# Patient Record
Sex: MALE | Race: WHITE | Employment: FULL TIME | URBAN - METROPOLITAN AREA
[De-identification: names, ages, dates, MRNs, and addresses within clinical notes are randomized per-mention and may not be internally consistent; named-entity substitution may affect disease eponyms.]

---

## 2017-04-01 DIAGNOSIS — F41.1 GAD (GENERALIZED ANXIETY DISORDER): ICD-10-CM

## 2017-04-03 RX ORDER — FLUOXETINE 40 MG/1
40 CAPSULE ORAL DAILY
Qty: 90 CAPSULE | Refills: 0 | Status: SHIPPED | OUTPATIENT
Start: 2017-04-03 | End: 2017-06-28

## 2017-04-03 NOTE — TELEPHONE ENCOUNTER
FLUoxetine (PROZAC) 40 MG capsule     Last Written Prescription Date: 10/4/16  Last Fill Quantity: 90, # refills: 1  Last Office Visit with G primary care provider: 12/6/2016       Last PHQ-9 score on record=   PHQ-9 SCORE 7/19/2016   Total Score 14         NAYELI Harrison  April 3, 2017  8:21 AM

## 2017-04-20 ENCOUNTER — OFFICE VISIT (OUTPATIENT)
Dept: URGENT CARE | Facility: URGENT CARE | Age: 37
End: 2017-04-20
Payer: COMMERCIAL

## 2017-04-20 VITALS
SYSTOLIC BLOOD PRESSURE: 120 MMHG | OXYGEN SATURATION: 98 % | TEMPERATURE: 97.7 F | BODY MASS INDEX: 31.41 KG/M2 | RESPIRATION RATE: 16 BRPM | DIASTOLIC BLOOD PRESSURE: 70 MMHG | WEIGHT: 200.56 LBS | HEART RATE: 81 BPM

## 2017-04-20 DIAGNOSIS — J06.9 VIRAL URI: Primary | ICD-10-CM

## 2017-04-20 DIAGNOSIS — J02.8 ACUTE PHARYNGITIS DUE TO OTHER SPECIFIED ORGANISMS: ICD-10-CM

## 2017-04-20 LAB
DEPRECATED S PYO AG THROAT QL EIA: NORMAL
MICRO REPORT STATUS: NORMAL
SPECIMEN SOURCE: NORMAL

## 2017-04-20 PROCEDURE — 87081 CULTURE SCREEN ONLY: CPT | Performed by: FAMILY MEDICINE

## 2017-04-20 PROCEDURE — 99213 OFFICE O/P EST LOW 20 MIN: CPT | Performed by: PHYSICIAN ASSISTANT

## 2017-04-20 PROCEDURE — 87880 STREP A ASSAY W/OPTIC: CPT | Performed by: FAMILY MEDICINE

## 2017-04-20 NOTE — PATIENT INSTRUCTIONS

## 2017-04-20 NOTE — MR AVS SNAPSHOT
After Visit Summary   4/20/2017    Kolby Funk    MRN: [id number]           Patient Information     Date Of Birth          1980        Visit Information        Provider Department      4/20/2017 11:40 AM Nasim Knight PA-C [facility]        Today's Diagnoses     Viral URI    -  1    Acute pharyngitis due to other specified organisms          Care Instructions      Viral Upper Respiratory Illness (Adult)  You have a viral upper respiratory illness (URI), which is another term for the common cold. This illness is contagious during the first few days. It is spread through the air by coughing and sneezing. It may also be spread by direct contact (touching the sick person and then touching your own eyes, nose, or mouth). Frequent handwashing will decrease risk of spread. Most viral illnesses go away within 7 to 10 days with rest and simple home remedies. Sometimes the illness may last for several weeks. Antibiotics will not kill a virus, and they are generally not prescribed for this condition.    Home care    If symptoms are severe, rest at home for the first 2 to 3 days. When you resume activity, don't let yourself get too tired.    Avoid being exposed to cigarette smoke (yours or others ).    You may use acetaminophen or ibuprofen to control pain and fever, unless another medicine was prescribed. (Note: If you have chronic liver or kidney disease, have ever had a stomach ulcer or gastrointestinal bleeding, or are taking blood-thinning medicines, talk with your healthcare provider before using these medicines.) Aspirin should never be given to anyone under 18 years of age who is ill with a viral infection or fever. It may cause severe liver or brain damage.    Your appetite may be poor, so a light diet is fine. Avoid dehydration by drinking 6 to 8 glasses of fluids per day (water, soft drinks, juices, tea, or soup). Extra fluids will help loosen secretions in  the nose and lungs.    Over-the-counter cold medicines will not shorten the length of time you re sick, but they may be helpful for the following symptoms: cough, sore throat, and nasal and sinus congestion. (Note: Do not use decongestants if you have high blood pressure.)  Follow-up care  Follow up with your healthcare provider, or as advised.  When to seek medical advice  Call your healthcare provider right away if any of these occur:    Cough with lots of colored sputum (mucus)    Severe headache; face, neck, or ear pain    Difficulty swallowing due to throat pain    Fever of 100.4 F (38 C)  Call 911, or get immediate medical care  Call emergency services right away if any of these occur:    Chest pain, shortness of breath, wheezing, or difficulty breathing    Coughing up blood    Inability to swallow due to throat pain        4871-1784 [employer]. [address] [place] [address]. All rights reserved. This information is not intended as a substitute for professional medical care. Always follow your healthcare professional's instructions.              Follow-ups after your visit        Who to contact     If you have questions or need follow up information about today's clinic visit or your schedule please contact [facility] directly at [phone].  Normal or non-critical lab and imaging results will be communicated to you by Credpor[employer], letter or phone within 4 business days after the clinic has received the results. If you do not hear from us within 7 days, please contact the clinic through [employer] or phone. If you have a critical or abnormal lab result, we will notify you by phone as soon as possible.  Submit refill requests through [employer] or call your pharmacy and they will forward the refill request to us. Please allow 3 business days for your refill to be completed.          Additional Information About Your Visit        INS[employer]EN[employer] Information     [employer] lets you send  "messages to your doctor, view your test results, renew your prescriptions, schedule appointments and more. To sign up, go to [email or web address] . Click on \"Log in\" on the left side of the screen, which will take you to the Welcome page. Then click on \"Sign up Now\" on the right side of the page.     You will be asked to enter the access code listed below, as well as some personal information. Please follow the directions to create your username and password.     Your access code is: [email or web address]  Expires: 2017  1:01 PM     Your access code will  in 90 days. If you need help or a new code, please call your [place] clinic or [phone].        Care EveryWhere ID     This is your [facility] EveryWhere ID. This could be used by other organizations to access your [place] medical records  [id number]        Your Vitals Were     Pulse Temperature Respirations Pulse Oximetry BMI (Body Mass Index)       81 97.7  F (36.5  C) (Tympanic) 16 98% 31.41 kg/m2        Blood Pressure from Last 3 Encounters:   17 120/70   16 122/88   10/04/16 110/82    Weight from Last 3 Encounters:   17 200 lb 9 oz (91 kg)   16 194 lb (88 kg)   10/04/16 187 lb (84.8 kg)              We Performed the Following     Beta strep group A culture     Strep, Rapid Screen        Primary Care Provider Office Phone # Fax #    Abraham Popeye Cardoza [phone]       [facility] [address]  [address] [facility] [address]        Thank you!     Thank you for choosing [facility]  for your care. Our goal is always to provide you with excellent care. Hearing back from our patients is one way we can continue to improve our services. Please take a few minutes to complete the written survey that you may receive in the mail after your visit with us. Thank you!             Your Updated Medication List - Protect others around you: Learn how to safely use, store and throw away your medicines at " www.disposemymeds.org.          This list is accurate as of: 4/20/17  1:01 PM.  Always use your most recent med list.                   Brand Name Dispense Instructions for use    FLUoxetine 40 MG capsule    PROzac    90 capsule    Take 1 capsule (40 mg) by mouth daily

## 2017-04-21 LAB
BACTERIA SPEC CULT: NORMAL
MICRO REPORT STATUS: NORMAL
SPECIMEN SOURCE: NORMAL

## 2017-04-21 NOTE — PROGRESS NOTES
"SUBJECTIVE:    Kolby Funk presents to clinic today for evaluation of sore throat, nasal congestion, clear rhinorrhea, possible  low grade fever(subjective)  and a mild dry, non-productive cough x 4 days duration.     Child dx with Strep today.     Patient denies any SOB, wheezing or hx of resp dz. Denies any N/V/D, rash, abdominal pain or any other acute illness sxs.          OBJECTIVE:  /70 (BP Location: Right arm, Patient Position: Chair, Cuff Size: Adult Large)  Pulse 81  Temp 97.7  F (36.5  C) (Tympanic)  Resp 16  Wt 200 lb 9 oz (91 kg)  SpO2 98%  BMI 31.41 kg/m2    General appearance: alert and no apparent distress  Skin color is pink and without rash.  HEENT:   Conjunctiva not injected.  Sclera clear.  Left TM is normal: no effusions, no erythema, and normal landmarks.  Right TM is normal: no effusions, no erythema, and normal landmarks.  Nasal mucosa is congested  Oropharyngeal exam is positive for mild, diffuse, erythema.  No plaque, exudate, lesions, or ulcers.   Neck is supple with no adenopathy  CARDIAC:NORMAL - regular rate and rhythm without murmur.  RESP: Normal - CTA without rales, rhonchi, or wheezing.    Component      Latest Ref Rng & Units 4/20/2017   Specimen Description       Throat   Rapid Strep A Screen       NEGATIVE: No Group A streptococcal antigen detected by immunoassay, await . . .   Micro Report Status       FINAL 04/20/2017       ASSESSMENT/PLAN:      (J06.9,  B97.89) Viral URI  (primary encounter diagnosis)  Plan: Rtc if sxs change, worsen or fail to resolve with home comfort care measures over the next 5-7 days.   In addition to the above, viral URI \"red flag\" signs and sxs are reviewed with pt both verbally and by way of printed educational material for home review.  Pt verbalizes understanding of and agrees to the above plan.         (J02.8) Acute pharyngitis due to other specified organisms  Plan: Strep, Rapid Screen, Beta strep group A culture  As per above     "

## 2017-06-28 DIAGNOSIS — F41.1 GAD (GENERALIZED ANXIETY DISORDER): ICD-10-CM

## 2017-06-28 RX ORDER — FLUOXETINE 40 MG/1
40 CAPSULE ORAL DAILY
Qty: 30 CAPSULE | Refills: 0 | Status: SHIPPED | OUTPATIENT
Start: 2017-06-28 | End: 2017-07-25

## 2017-06-28 NOTE — LETTER
[address], [address]  [facility] [address]  Phone: [phone]  Fax: [phone]    06/28/17    Kolby Funk  [address] [place] [address]      Dear Kolby:     We recently received a call from your pharmacy requesting a refill of your medication.    A review of your chart indicates that an appointment is required with your provider for office visit. Please call the clinic at [phone] to schedule your appointment.    We have authorized one refill of your medication to allow time for you to schedule your appointment.    Taking care of your health is important to us, and ongoing visits with your provider are vital to your care.  We look forward to seeing you in the near future.          Sincerely,      Abraham Cardoza MD/Adeline SYED RN

## 2017-06-28 NOTE — TELEPHONE ENCOUNTER
FLUoxetine (PROZAC) 40 MG capsule     Last Written Prescription Date: 4/3/17  Last Fill Quantity: 90, # refills: 0  Last Office Visit with [facility] primary care provider: 12/6/2016       Last PHQ-9 score on record=   PHQ-9 SCORE 7/19/2016   Total Score 14         NAYELI Harrison  June 28, 2017  8:25 AM

## 2017-06-28 NOTE — TELEPHONE ENCOUNTER
Medication is being filled for 1 time refill only due to:  due for office visit, PHQ 9, letter sent to schedule appt     Adeline Askew RN, BS  Clinical Nurse Triage.

## 2017-07-25 DIAGNOSIS — F41.1 GAD (GENERALIZED ANXIETY DISORDER): ICD-10-CM

## 2017-07-25 NOTE — TELEPHONE ENCOUNTER
Pending Prescriptions:                       Disp   Refills    FLUoxetine (PROZAC) 40 MG capsule [Pharma*30 cap*0            Sig: TAKE 1 CAPSULE (40 MG) BY MOUTH DAILY    FLUOXETINE HCL 40 MG CAPSULE      Last Written Prescription Date: 06/28/17  Last Fill Quantity: 30,  # refills: 0   Last Office Visit with [facility], [facility] or [facility] prescribing provider: 12/06/16

## 2017-07-27 RX ORDER — FLUOXETINE 40 MG/1
CAPSULE ORAL
Qty: 30 CAPSULE | Refills: 2 | Status: SHIPPED | OUTPATIENT
Start: 2017-07-27 | End: 2017-10-25

## 2017-07-27 NOTE — TELEPHONE ENCOUNTER
Prescription approved per [facility] Refill Protocol.  Mikki Villafana RN

## 2017-10-25 DIAGNOSIS — F41.1 GAD (GENERALIZED ANXIETY DISORDER): ICD-10-CM

## 2017-10-25 NOTE — TELEPHONE ENCOUNTER
Last PHQ-9 score on record=   PHQ-9 SCORE 7/19/2016   Total Score 14       Last Office Visit with [facility], [facility] or  [facility] prescribing provider: 12/6/2016

## 2017-10-26 NOTE — TELEPHONE ENCOUNTER
Patient needs to be seen.  > 1 year since last office visit.  RX says patient taking for Anxiety, but PHQ-9 done 7/19/2016 was 14.  Depression is not on patient's problem list.  Mikki Villafana RN

## 2017-10-30 RX ORDER — FLUOXETINE 40 MG/1
CAPSULE ORAL
Qty: 30 CAPSULE | Refills: 0 | Status: SHIPPED | OUTPATIENT
Start: 2017-10-30 | End: 2017-12-06

## 2017-10-30 NOTE — TELEPHONE ENCOUNTER
Routing refill request to provider for review/approval because:  PHQ out of range and over 1 year since OV for this issue.     LM again today for call back -  Ok for 1 X refill?    Teresa Coronado, RN

## 2018-01-09 DIAGNOSIS — F41.1 GAD (GENERALIZED ANXIETY DISORDER): ICD-10-CM

## 2018-01-09 RX ORDER — FLUOXETINE 40 MG/1
CAPSULE ORAL
Qty: 30 CAPSULE | Refills: 0 | Status: SHIPPED | OUTPATIENT
Start: 2018-01-09 | End: 2018-02-11

## 2018-01-09 NOTE — TELEPHONE ENCOUNTER
"Requested Prescriptions   Pending Prescriptions Disp Refills     FLUoxetine (PROZAC) 40 MG capsule [Pharmacy Med Name: FLUOXETINE HCL 40 MG CAPSULE] 30 capsule 0    Last Written Prescription Date:  12/8/17  Last Fill Quantity: 30,  # refills: 0   [facility] Visit with [facility] or [facility] prescribing provider:  12/6/2016   Future Office Visit:    Sig: TAKE 1 CAPSULE (40 MG) BY MOUTH DAILY DUE FOR APPT    SSRIs Protocol Failed    PHQ-9 SCORE 7/19/2016   Total Score 14     NICHOLAS-7 SCORE 7/19/2016 10/4/2016   Total Score 19 8          Failed - Recent or future visit with authorizing provider    Patient had office visit in the last year or has a visit in the next 30 days with authorizing provider.  See \"Patient Info\" tab in inbasket, or \"Choose Columns\" in Meds & Orders section of the refill encounter.              Passed - Patient is age 18 or older        From last Refill:  Sulma Bridges RN    12/8/17  2:59 PM   Note      Routing refill request to provider for review/approval because:  Patient needs to be seen because it has been more than 1 year since last office visit.  Not returning call          "

## 2018-01-09 NOTE — TELEPHONE ENCOUNTER
Routing refill request to provider for review/approval because:  Cindy given x1 and patient did not follow up, please advise  PHQ 9 > 4 & out of date  Last OV over a yr ago    Adeline Askew RN, BS  Clinical Nurse Triage.

## 2018-02-11 DIAGNOSIS — F41.1 GAD (GENERALIZED ANXIETY DISORDER): ICD-10-CM

## 2018-02-12 RX ORDER — FLUOXETINE 40 MG/1
CAPSULE ORAL
Qty: 30 CAPSULE | Refills: 0 | Status: SHIPPED | OUTPATIENT
Start: 2018-02-12 | End: 2018-05-22

## 2018-02-12 NOTE — TELEPHONE ENCOUNTER
Patient had an appointment on 4/20/2017 and 1/16/2018 but canceled both appointments.    LAST OV >1 YEAR AGO.    Mikki Villafana RN

## 2018-02-12 NOTE — TELEPHONE ENCOUNTER
"Requested Prescriptions   Pending Prescriptions Disp Refills     FLUoxetine (PROZAC) 40 MG capsule [Pharmacy Med Name: FLUOXETINE HCL 40 MG CAPSULE] 30 capsule 0    Last Written Prescription Date:  1/9/18  Last Fill Quantity: 30,  # refills: 0   Last Office Visit: 12/6/2016   Future Office Visit:      Sig: TAKE 1 CAPSULE (40 MG) BY MOUTH DAILY DUE FOR APPT    SSRIs Protocol Failed    PHQ-9 SCORE 7/19/2016   Total Score 14     NICHOLAS-7 SCORE 7/19/2016 10/4/2016   Total Score 19 8            Failed - Recent or future visit with authorizing provider    Patient had office visit in the last year or has a visit in the next 30 days with authorizing provider.  See \"Patient Info\" tab in inbasket, or \"Choose Columns\" in Meds & Orders section of the refill encounter.            Passed - Patient is age 18 or older          "

## 2018-05-22 ENCOUNTER — OFFICE VISIT (OUTPATIENT)
Dept: FAMILY MEDICINE | Facility: CLINIC | Age: 38
End: 2018-05-22
Payer: COMMERCIAL

## 2018-05-22 VITALS
BODY MASS INDEX: 30.42 KG/M2 | OXYGEN SATURATION: 97 % | HEIGHT: 68 IN | SYSTOLIC BLOOD PRESSURE: 124 MMHG | TEMPERATURE: 98.2 F | WEIGHT: 200.7 LBS | RESPIRATION RATE: 16 BRPM | DIASTOLIC BLOOD PRESSURE: 88 MMHG | HEART RATE: 77 BPM

## 2018-05-22 DIAGNOSIS — F41.1 GAD (GENERALIZED ANXIETY DISORDER): ICD-10-CM

## 2018-05-22 PROCEDURE — 99214 OFFICE O/P EST MOD 30 MIN: CPT | Performed by: FAMILY MEDICINE

## 2018-05-22 RX ORDER — FLUOXETINE 40 MG/1
40 CAPSULE ORAL DAILY
Qty: 90 CAPSULE | Refills: 1 | Status: SHIPPED | OUTPATIENT
Start: 2018-05-22 | End: 2018-11-11

## 2018-05-22 ASSESSMENT — ENCOUNTER SYMPTOMS
NERVOUS/ANXIOUS: 1
RESPIRATORY NEGATIVE: 1
DEPRESSION: 0
CONSTITUTIONAL NEGATIVE: 1
CARDIOVASCULAR NEGATIVE: 1
INSOMNIA: 1

## 2018-05-22 ASSESSMENT — ANXIETY QUESTIONNAIRES
2. NOT BEING ABLE TO STOP OR CONTROL WORRYING: NEARLY EVERY DAY
GAD7 TOTAL SCORE: 15
3. WORRYING TOO MUCH ABOUT DIFFERENT THINGS: NEARLY EVERY DAY
5. BEING SO RESTLESS THAT IT IS HARD TO SIT STILL: SEVERAL DAYS
1. FEELING NERVOUS, ANXIOUS, OR ON EDGE: MORE THAN HALF THE DAYS
IF YOU CHECKED OFF ANY PROBLEMS ON THIS QUESTIONNAIRE, HOW DIFFICULT HAVE THESE PROBLEMS MADE IT FOR YOU TO DO YOUR WORK, TAKE CARE OF THINGS AT HOME, OR GET ALONG WITH OTHER PEOPLE: VERY DIFFICULT
6. BECOMING EASILY ANNOYED OR IRRITABLE: NEARLY EVERY DAY
7. FEELING AFRAID AS IF SOMETHING AWFUL MIGHT HAPPEN: SEVERAL DAYS

## 2018-05-22 ASSESSMENT — PATIENT HEALTH QUESTIONNAIRE - PHQ9: 5. POOR APPETITE OR OVEREATING: MORE THAN HALF THE DAYS

## 2018-05-22 ASSESSMENT — LIFESTYLE VARIABLES: SUBSTANCE_ABUSE: 0

## 2018-05-22 NOTE — MR AVS SNAPSHOT
"              After Visit Summary   2018    Kolby Funk    MRN: [id number]           Patient Information     Date Of Birth          1980        Visit Information        Provider Department      2018 5:20 PM Abraham Cardoza MD [facility]        Today's Diagnoses     NICHOLAS (generalized anxiety disorder)           Follow-ups after your visit        Follow-up notes from your care team     Return in about 1 month (around 2018) for Physical Exam, depression/anxiety.      Who to contact     If you have questions or need follow up information about today's clinic visit or your schedule please contact [facility] directly at [phone].  Normal or non-critical lab and imaging results will be communicated to you by MyChart, letter or phone within 4 business days after the clinic has received the results. If you do not hear from us within 7 days, please contact the clinic through MyChart or phone. If you have a critical or abnormal lab result, we will notify you by phone as soon as possible.  Submit refill requests through [employer] or call your pharmacy and they will forward the refill request to us. Please allow 3 business days for your refill to be completed.          Additional Information About Your Visit        MyChart Information     [employer] lets you send messages to your doctor, view your test results, renew your prescriptions, schedule appointments and more. To sign up, go to [email or web address] . Click on \"Log in\" on the left side of the screen, which will take you to the Welcome page. Then click on \"Sign up Now\" on the right side of the page.     You will be asked to enter the access code listed below, as well as some personal information. Please follow the directions to create your username and password.     Your access code is: [email or web address]  Expires: 2018  5:37 PM     Your access code will  in 90 days. If you need help or a new code, " "please call your [place] clinic or [phone].        Care EveryWhere ID     This is your Care EveryWhere ID. This could be used by other organizations to access your [place] medical records  [id number]        Your Vitals Were     Pulse Temperature Respirations Height Pulse Oximetry BMI (Body Mass Index)    77 98.2  F (36.8  C) (Oral) 16 5' 8.25\" (1.734 m) 97% 30.29 kg/m2       Blood Pressure from Last 3 Encounters:   05/22/18 124/88   04/20/17 120/70   12/06/16 122/88    Weight from Last 3 Encounters:   05/22/18 200 lb 11.2 oz (91 kg)   04/20/17 200 lb 9 oz (91 kg)   12/06/16 194 lb (88 kg)              Today, you had the following     No orders found for display         Today's Medication Changes          These changes are accurate as of 5/22/18  5:37 PM.  If you have any questions, ask your nurse or doctor.               These medicines have changed or have updated prescriptions.        Dose/Directions    FLUoxetine 40 MG capsule   Commonly known as:  PROzac   This may have changed:  See the new instructions.   Used for:  NICHOLSA (generalized anxiety disorder)   Changed by:  Abraham Cardoza MD        Dose:  40 mg   Take 1 capsule (40 mg) by mouth daily   Quantity:  90 capsule   Refills:  1            Where to get your medicines      These medications were sent to [facility]/pharmacy #[id number] - [place], [place] - [address]  [address] [facility], [facility] [address]     Phone:  [phone]     FLUoxetine 40 MG capsule                Primary Care Provider Office Phone # Fax #    Abraham Cardoza [phone]       [address] [facility] [address]        Equal Access to Services     [facility] : Hadii ruben conte Soharoon, waaxda luqadaha, qaybta kaalmada adeegyada, deepthi terrazas. So [facility] [phone].    ATENCIÓN: Si habla español, tiene a dawn disposición servicios gratuitos de asistencia lingüística. Llame al [phone].    We comply with " applicable federal civil rights laws and [place] laws. We do not discriminate on the basis of race, color, national origin, age, disability, sex, sexual orientation, or gender identity.            Thank you!     Thank you for choosing [facility]  for your care. Our goal is always to provide you with excellent care. Hearing back from our patients is one way we can continue to improve our services. Please take a few minutes to complete the written survey that you may receive in the mail after your visit with us. Thank you!             Your Updated Medication List - Protect others around you: Learn how to safely use, store and throw away your medicines at www.disposemymeds.org.          This list is accurate as of 5/22/18  5:37 PM.  Always use your most recent med list.                   Brand Name Dispense Instructions for use Diagnosis    FLUoxetine 40 MG capsule    PROzac    90 capsule    Take 1 capsule (40 mg) by mouth daily    NICHOLAS (generalized anxiety disorder)

## 2018-05-22 NOTE — PROGRESS NOTES
HPI    SUBJECTIVE:   Kolby Funk is a 37 year old male who presents to clinic today for the following health issues:    Anxiety Follow-Up    Status since last visit: Worsened ran out of medication    Other associated symptoms: difficulty sleeping    Complicating factors:   Significant life event: No   Current substance abuse: None  Depression symptoms: No  NICHOLAS-7 SCORE 7/19/2016 10/4/2016   Total Score 19 8       NICHOLAS-7    Amount of exercise or physical activity: 2-3 days/week for an average of 30-45 minutes    Problems taking medications regularly: Yes,  Ran out of medication    Medication side effects: none    Diet: regular (no restrictions)      Ran out of medication about 3-4 months ago. Was OK for a couple of weeks then started having more issues again.  Now is having lots of trouble sleeping.  Both falling asleep and staying asleep is difficult.  Has been using medicaiton for a long time, no issues side effects.      Review of Systems   Constitutional: Negative.    Respiratory: Negative.    Cardiovascular: Negative.    Psychiatric/Behavioral: Negative for depression, substance abuse and suicidal ideas. The patient is nervous/anxious and has insomnia.          Physical Exam   Constitutional: He is oriented to person, place, and time.   Neurological: He is alert and oriented to person, place, and time.   Skin: Skin is warm and dry.   Psychiatric: Mood and affect normal.   Vitals reviewed.    (F41.1) NICHOLAS (generalized anxiety disorder)  Comment: restarting  Plan: FLUoxetine (PROZAC) 40 MG capsule              RTC in 1m for CPE and f/u    Abraham Cardoza MD

## 2018-05-23 ASSESSMENT — ANXIETY QUESTIONNAIRES: GAD7 TOTAL SCORE: 15

## 2018-10-23 ENCOUNTER — OFFICE VISIT (OUTPATIENT)
Dept: NURSING | Facility: CLINIC | Age: 38
End: 2018-10-23
Payer: COMMERCIAL

## 2018-10-23 DIAGNOSIS — Z23 NEED FOR PROPHYLACTIC VACCINATION AND INOCULATION AGAINST INFLUENZA: Primary | ICD-10-CM

## 2018-10-23 PROCEDURE — 90471 IMMUNIZATION ADMIN: CPT

## 2018-10-23 PROCEDURE — 90686 IIV4 VACC NO PRSV 0.5 ML IM: CPT

## 2018-10-23 NOTE — MR AVS SNAPSHOT
"              After Visit Summary   10/23/2018    Kolby Funk    MRN: [id number]           Patient Information     Date Of Birth          1980        Visit Information        Provider Department      10/23/2018 6:00 PM ASHOK TAYLOR/LPN [facility]        Today's Diagnoses     Need for prophylactic vaccination and inoculation against influenza    -  1       Follow-ups after your visit        Who to contact     If you have questions or need follow up information about today's clinic visit or your schedule please contact [facility] directly at [phone].  Normal or non-critical lab and imaging results will be communicated to you by [email or web address], letter or phone within 4 business days after the clinic has received the results. If you do not hear from us within 7 days, please contact the clinic through FeedMagnett or phone. If you have a critical or abnormal lab result, we will notify you by phone as soon as possible.  Submit refill requests through OfficialVirtualDJ or call your pharmacy and they will forward the refill request to us. Please allow 3 business days for your refill to be completed.          Additional Information About Your Visit        MyChart Information     OfficialVirtualDJ lets you send messages to your doctor, view your test results, renew your prescriptions, schedule appointments and more. To sign up, go to [email or web address] . Click on \"Log in\" on the left side of the screen, which will take you to the Welcome page. Then click on \"Sign up Now\" on the right side of the page.     You will be asked to enter the access code listed below, as well as some personal information. Please follow the directions to create your username and password.     Your access code is: [email or web address]  Expires: 2019  6:40 PM     Your access code will  in 90 days. If you need help or a new code, please call your [facility] or [phone].        Care EveryWhere ID     This is your Care " EveryWhere ID. This could be used by other organizations to access your [place] medical records  [id number]         Blood Pressure from Last 3 Encounters:   05/22/18 124/88   04/20/17 120/70   12/06/16 122/88    Weight from Last 3 Encounters:   05/22/18 200 lb 11.2 oz (91 kg)   04/20/17 200 lb 9 oz (91 kg)   12/06/16 194 lb (88 kg)              We Performed the Following     FLU VACCINE, SPLIT VIRUS, IM (QUADRIVALENT) [24916]- >3 YRS     Vaccine Administration, Initial [47869]        Primary Care Provider Office Phone # Fax #    Abraham Popeye Cardoza [phone]       [id number]  [facility] [address]        Equal Access to Services     MURPHY [place] : Hadii ruben cisse hadasho Soomaali, waaxda luqadaha, qaybta kaalmada adeegyada, deepthi terrazas. So [facility] [phone].    ATENCIÓN: Si habla español, tiene a dawn disposición servicios gratuitos de asistencia lingüística. Llame al [phone].    We comply with applicable federal civil rights laws and [place] laws. We do not discriminate on the basis of race, color, national origin, age, disability, sex, sexual orientation, or gender identity.            Thank you!     Thank you for choosing [facility]  for your care. Our goal is always to provide you with excellent care. Hearing back from our patients is one way we can continue to improve our services. Please take a few minutes to complete the written survey that you may receive in the mail after your visit with us. Thank you!             Your Updated Medication List - Protect others around you: Learn how to safely use, store and throw away your medicines at www.disposemymeds.org.          This list is accurate as of 10/23/18  6:40 PM.  Always use your most recent med list.                   Brand Name Dispense Instructions for use Diagnosis    FLUoxetine 40 MG capsule    PROzac    90 capsule    Take 1 capsule (40 mg) by mouth daily    NICHOLAS (generalized  anxiety disorder)

## 2018-10-23 NOTE — PROGRESS NOTES
Injectable Influenza Immunization Documentation    1.  Is the person to be vaccinated sick today?   No    2. Does the person to be vaccinated have an allergy to a component   of the vaccine?   No  Egg Allergy Algorithm Link    3. Has the person to be vaccinated ever had a serious reaction   to influenza vaccine in the past?   No    4. Has the person to be vaccinated ever had Guillain-Barré syndrome?   No    Form completed by Fiorella White CMA ([facility])

## 2019-02-22 DIAGNOSIS — F41.1 GAD (GENERALIZED ANXIETY DISORDER): ICD-10-CM

## 2019-02-22 NOTE — TELEPHONE ENCOUNTER
"Requested Prescriptions   Pending Prescriptions Disp Refills     FLUoxetine (PROZAC) 40 MG capsule [Pharmacy Med Name: FLUOXETINE HCL 40 MG CAPSULE]  PATIENT NEEDS AN OFFICE VISIT FOR FURTHER REFILLS.  Last Written Prescription Date:  2/4/19  Last Fill Quantity: 20 CAPSULE,  # refills: 0   Last office visit: 5/22/2018 with prescribing provider:  LOUISE   Future Office Visit:     20 capsule 0     Sig: TAKE 1 CAPSULE (40 MG) BY MOUTH DAILY **DUE FOR APPT**    SSRIs Protocol Passed - 2/22/2019  3:41 PM  PHQ-9 SCORE 7/19/2016   PHQ-9 Total Score 14     NICHOLAS-7 SCORE 7/19/2016 10/4/2016 5/22/2018   Total Score 19 8 15              Passed - Recent (12 mo) or future (30 days) visit within the authorizing provider's specialty    Patient had office visit in the last 12 months or has a visit in the next 30 days with authorizing provider or within the authorizing provider's specialty.  See \"Patient Info\" tab in inbasket, or \"Choose Columns\" in Meds & Orders section of the refill encounter.             Passed - Medication is active on med list       Passed - Patient is age 18 or older          "

## 2019-02-25 RX ORDER — FLUOXETINE 40 MG/1
CAPSULE ORAL
Qty: 20 CAPSULE | Refills: 0 | Status: SHIPPED | OUTPATIENT
Start: 2019-02-25 | End: 2019-03-30

## 2019-02-25 NOTE — TELEPHONE ENCOUNTER
Prescription approved per [facility] Refill Protocol.    Saba Gallego, RN  Patient Care Supervisor  [facility] [place][note 2]  [phone]

## 2019-03-15 DIAGNOSIS — F41.1 GAD (GENERALIZED ANXIETY DISORDER): ICD-10-CM

## 2019-03-15 NOTE — TELEPHONE ENCOUNTER
"Requested Prescriptions   Pending Prescriptions Disp Refills     FLUoxetine (PROZAC) 40 MG capsule [Pharmacy Med Name: FLUOXETINE HCL 40 MG CAPSULE]    Last Written Prescription Date:  2/25/19  Last Fill Quantity: 20,  # refills: 0   Last office visit: 5/22/2018 with prescribing provider:  Nani   Future Office Visit:     20 capsule 0     Sig: TAKE 1 CAPSULE (40 MG) BY MOUTH DAILY **DUE FOR APPT**    SSRIs Protocol Passed - 3/15/2019  3:37 PM       Passed - Recent (12 mo) or future (30 days) visit within the authorizing provider's specialty    Patient had office visit in the last 12 months or has a visit in the next 30 days with authorizing provider or within the authorizing provider's specialty.  See \"Patient Info\" tab in inbasket, or \"Choose Columns\" in Meds & Orders section of the refill encounter.             Passed - Medication is active on med list       Passed - Patient is age 18 or older          "

## 2019-03-19 RX ORDER — FLUOXETINE 40 MG/1
CAPSULE ORAL
Refills: 0 | OUTPATIENT
Start: 2019-03-19

## 2019-03-19 NOTE — TELEPHONE ENCOUNTER
Routing refill request to provider for review/approval because:  Cindy given x3 and patient did not follow up, please advise  Pt was due in June and has not responded to clinic  Derek Bridges RN, BSN

## 2019-03-25 DIAGNOSIS — F41.1 GAD (GENERALIZED ANXIETY DISORDER): ICD-10-CM

## 2019-03-25 NOTE — TELEPHONE ENCOUNTER
"Requested Prescriptions   Pending Prescriptions Disp Refills     FLUoxetine (PROZAC) 40 MG capsule [Pharmacy Med Name: FLUOXETINE HCL 40 MG CAPSULE] 20 capsule 0    Last Written Prescription Date:  02/25/2019  Last Fill Quantity: 20 tablet,  # refills: 0   Last office visit: 5/22/2018 with prescribing provider:  05/22/2018   Future Office Visit:     Sig: TAKE 1 CAPSULE (40 MG) BY MOUTH DAILY DUE FOR APPT    SSRIs Protocol Passed - 3/25/2019 10:07 AM       Passed - Recent (12 mo) or future (30 days) visit within the authorizing provider's specialty    Patient had office visit in the last 12 months or has a visit in the next 30 days with authorizing provider or within the authorizing provider's specialty.  See \"Patient Info\" tab in inbasket, or \"Choose Columns\" in Meds & Orders section of the refill encounter.             Passed - Medication is active on med list       Passed - Patient is age 18 or older        aJcob TYLER  "

## 2019-03-26 RX ORDER — FLUOXETINE 40 MG/1
CAPSULE ORAL
Qty: 20 CAPSULE | Refills: 0 | OUTPATIENT
Start: 2019-03-26

## 2019-03-26 NOTE — TELEPHONE ENCOUNTER
See 3/15/2019 refill.  RX was denied by Dr. Cardoza. Patient needs an appointment.    Mikki Villafana RN

## 2019-03-30 DIAGNOSIS — F41.1 GAD (GENERALIZED ANXIETY DISORDER): ICD-10-CM

## 2019-04-01 NOTE — TELEPHONE ENCOUNTER
"Requested Prescriptions   Pending Prescriptions Disp Refills     FLUoxetine (PROZAC) 40 MG capsule [Pharmacy Med Name: FLUOXETINE HCL 40 MG CAPSULE] 20 capsule 0    Last Written Prescription Date:  2/25/19  Last Fill Quantity: 20,  # refills: 0   Last Office Visit: 5/22/2018   Future Office Visit:      Sig: TAKE 1 CAPSULE (40 MG) BY MOUTH DAILY DUE FOR APPT    SSRIs Protocol Passed - 3/30/2019  3:54 PM   PHQ-9 SCORE 7/19/2016   PHQ-9 Total Score 14     NICHOLAS-7 SCORE 7/19/2016 10/4/2016 5/22/2018   Total Score 19 8 15         Passed - Recent (12 mo) or future (30 days) visit within the authorizing provider's specialty    Patient had office visit in the last 12 months or has a visit in the next 30 days with authorizing provider or within the authorizing provider's specialty.  See \"Patient Info\" tab in inbasket, or \"Choose Columns\" in Meds & Orders section of the refill encounter.             Passed - Medication is active on med list       Passed - Patient is age 18 or older        "

## 2019-04-02 RX ORDER — FLUOXETINE 40 MG/1
CAPSULE ORAL
Qty: 20 CAPSULE | Refills: 0 | Status: SHIPPED | OUTPATIENT
Start: 2019-04-02 | End: 2019-04-30

## 2019-04-02 NOTE — TELEPHONE ENCOUNTER
Spoke with patient to confirm that he is unable to come in due to work schedule.  Only available on Tuesdays.  Offered a Saturday appointment at [place] but patient declined seeing another physician.  Please advise whether waiting until 4/23 is appropriate or if a telephone visit would work.  Fiorella White CMA ([facility])

## 2019-04-02 NOTE — TELEPHONE ENCOUNTER
Patient called and would like to know if we can send in a refill for him until his appointment on 4/23/19. Patient is traveling for work and this is the 1st Tuesday he can come in.   Please review and advise.    Jamila Reilly

## 2019-04-02 NOTE — TELEPHONE ENCOUNTER
Will route to MA/TC pool. Also routing to PCP.     Looks like multiple heriberto refills had been issued to the Pt, he was advised to schedule an OV at those times.   All we can do is again call him and offer an office visit.     Will route to Dr. Cardoza as well to review.     Amelia Tai RN -- [facility] Workforce

## 2019-04-04 NOTE — TELEPHONE ENCOUNTER
Left detailed message for patient to call to schedule an appointment.  Please remove refill and close this encounter.  Fiorella White CMA ([facility])

## 2019-04-30 ENCOUNTER — OFFICE VISIT (OUTPATIENT)
Dept: FAMILY MEDICINE | Facility: CLINIC | Age: 39
End: 2019-04-30
Payer: COMMERCIAL

## 2019-04-30 VITALS
TEMPERATURE: 98 F | RESPIRATION RATE: 16 BRPM | DIASTOLIC BLOOD PRESSURE: 68 MMHG | OXYGEN SATURATION: 97 % | SYSTOLIC BLOOD PRESSURE: 116 MMHG | WEIGHT: 207 LBS | BODY MASS INDEX: 31.37 KG/M2 | HEIGHT: 68 IN | HEART RATE: 84 BPM

## 2019-04-30 DIAGNOSIS — F41.1 GAD (GENERALIZED ANXIETY DISORDER): ICD-10-CM

## 2019-04-30 PROCEDURE — 99213 OFFICE O/P EST LOW 20 MIN: CPT | Performed by: NURSE PRACTITIONER

## 2019-04-30 RX ORDER — FLUOXETINE 40 MG/1
CAPSULE ORAL
Qty: 90 CAPSULE | Refills: 1 | Status: SHIPPED | OUTPATIENT
Start: 2019-04-30 | End: 2019-10-18

## 2019-04-30 ASSESSMENT — ANXIETY QUESTIONNAIRES
7. FEELING AFRAID AS IF SOMETHING AWFUL MIGHT HAPPEN: SEVERAL DAYS
GAD7 TOTAL SCORE: 12
6. BECOMING EASILY ANNOYED OR IRRITABLE: MORE THAN HALF THE DAYS
4. TROUBLE RELAXING: SEVERAL DAYS
7. FEELING AFRAID AS IF SOMETHING AWFUL MIGHT HAPPEN: SEVERAL DAYS
2. NOT BEING ABLE TO STOP OR CONTROL WORRYING: MORE THAN HALF THE DAYS
1. FEELING NERVOUS, ANXIOUS, OR ON EDGE: NEARLY EVERY DAY
GAD7 TOTAL SCORE: 12
3. WORRYING TOO MUCH ABOUT DIFFERENT THINGS: MORE THAN HALF THE DAYS
GAD7 TOTAL SCORE: 12
5. BEING SO RESTLESS THAT IT IS HARD TO SIT STILL: SEVERAL DAYS

## 2019-04-30 ASSESSMENT — PATIENT HEALTH QUESTIONNAIRE - PHQ9
SUM OF ALL RESPONSES TO PHQ QUESTIONS 1-9: 5
SUM OF ALL RESPONSES TO PHQ QUESTIONS 1-9: 5
10. IF YOU CHECKED OFF ANY PROBLEMS, HOW DIFFICULT HAVE THESE PROBLEMS MADE IT FOR YOU TO DO YOUR WORK, TAKE CARE OF THINGS AT HOME, OR GET ALONG WITH OTHER PEOPLE: SOMEWHAT DIFFICULT

## 2019-04-30 ASSESSMENT — MIFFLIN-ST. JEOR: SCORE: 1832.42

## 2019-04-30 NOTE — PROGRESS NOTES
Answers for HPI/ROS submitted by the patient on 4/30/2019   Chronic problems general questions HPI Form  If you checked off any problems, how difficult have these problems made it for you to do your work, take care of things at home, or get along with other people?: Somewhat difficult  PHQ9 TOTAL SCORE: 5  NICHOLAS 7 TOTAL SCORE: 12  Diet:: Regular (no restrictions)  Taking medications regularly:: Yes  Medication side effects:: None  Depression/Anxiety: Anxiety only  Status since last visit:: Stable  Other associated symptoms of anxiety:: None  Significant life event:: No  Current substance use:: Alcohol  Depression symptoms:: None    SUBJECTIVE:   Kolby Funk is a 38 year old male who presents to clinic today for the following   health issues:      Anxiety Follow-Up    Status since last visit: No change    Other associated symptoms:None    Complicating factors:   Significant life event: No   Current substance abuse: None  Depression symptoms: No  NICHOLAS-7 SCORE 10/4/2016 5/22/2018 4/30/2019   Total Score - - 12 (moderate anxiety)   Total Score 8 15 12       NICHOLAS-7    Amount of exercise or physical activity: None    Problems taking medications regularly: No    Medication side effects: none    Diet: regular (no restrictions)    Feels anxiety is well controlled.  No side effects.  Some nights has a hard time falling asleep others may wake during the night.  This is not bothersome to him.  Longstanding medication for him and he would like to have this refilled today.       Additional history: as documented    Reviewed  and updated as needed this visit by clinical staff  Tobacco  Allergies  Meds  Med Hx  Surg Hx  Fam Hx  Soc Hx        Reviewed and updated as needed this visit by Provider  Meds           No Known Allergies    ROS:  Constitutional, HEENT, cardiovascular, pulmonary, gi and gu and psych systems are negative, except as otherwise noted.    OBJECTIVE:     /68 (BP Location: Right arm, Patient Position:  "Sitting, Cuff Size: Adult Large)   Pulse 84   Temp 98  F (36.7  C) (Oral)   Resp 16   Ht 1.734 m (5' 8.25\")   Wt 93.9 kg (207 lb)   SpO2 97%   BMI 31.24 kg/m    Body mass index is 31.24 kg/m .  GENERAL: healthy, alert and no distress  EYES: Eyes grossly normal to inspection  NECK: no adenopathy, no asymmetry, masses, or scars and thyroid normal to palpation  RESP: lungs clear to auscultation - no rales, rhonchi or wheezes  CV: regular rate and rhythm, normal S1 S2, no S3 or S4, no murmur  SKIN: warm and dry   PSYCH: mentation appears normal, affect normal/bright    Diagnostic Test Results:  none     ASSESSMENT/PLAN:   1. NICHOLAS (generalized anxiety disorder)  Despite NICHOLAS=12 he feels symptoms are well controlled.  Continue prozac.  F/u in 6 mos; okay to do a phone or evisit.   - FLUoxetine (PROZAC) 40 MG capsule; TAKE 1 CAPSULE (40 MG) BY MOUTH DAILY  Dispense: 90 capsule; Refill: 1      HAYLEY Lund [facility]        "

## 2019-05-01 ASSESSMENT — PATIENT HEALTH QUESTIONNAIRE - PHQ9: SUM OF ALL RESPONSES TO PHQ QUESTIONS 1-9: 5

## 2019-05-01 ASSESSMENT — ANXIETY QUESTIONNAIRES: GAD7 TOTAL SCORE: 12

## 2019-10-18 ENCOUNTER — OFFICE VISIT (OUTPATIENT)
Dept: FAMILY MEDICINE | Facility: CLINIC | Age: 39
End: 2019-10-18
Payer: COMMERCIAL

## 2019-10-18 VITALS
BODY MASS INDEX: 32.6 KG/M2 | WEIGHT: 216 LBS | HEART RATE: 78 BPM | RESPIRATION RATE: 12 BRPM | OXYGEN SATURATION: 97 % | SYSTOLIC BLOOD PRESSURE: 120 MMHG | DIASTOLIC BLOOD PRESSURE: 72 MMHG | TEMPERATURE: 98.2 F

## 2019-10-18 DIAGNOSIS — Z23 NEED FOR PROPHYLACTIC VACCINATION AND INOCULATION AGAINST INFLUENZA: Primary | ICD-10-CM

## 2019-10-18 DIAGNOSIS — F41.1 GAD (GENERALIZED ANXIETY DISORDER): ICD-10-CM

## 2019-10-18 PROCEDURE — 99214 OFFICE O/P EST MOD 30 MIN: CPT | Mod: 25 | Performed by: FAMILY MEDICINE

## 2019-10-18 PROCEDURE — 90686 IIV4 VACC NO PRSV 0.5 ML IM: CPT | Performed by: FAMILY MEDICINE

## 2019-10-18 PROCEDURE — 96127 BRIEF EMOTIONAL/BEHAV ASSMT: CPT | Performed by: FAMILY MEDICINE

## 2019-10-18 PROCEDURE — 90471 IMMUNIZATION ADMIN: CPT | Performed by: FAMILY MEDICINE

## 2019-10-18 RX ORDER — LORAZEPAM 0.5 MG/1
0.5 TABLET ORAL EVERY 6 HOURS PRN
Qty: 20 TABLET | Refills: 1 | Status: SHIPPED | OUTPATIENT
Start: 2019-10-18 | End: 2024-03-22

## 2019-10-18 ASSESSMENT — ENCOUNTER SYMPTOMS
DIAPHORESIS: 1
NERVOUS/ANXIOUS: 1
DECREASED CONCENTRATION: 0
DYSPHORIC MOOD: 0
SLEEP DISTURBANCE: 0
PALPITATIONS: 1

## 2019-10-18 ASSESSMENT — PATIENT HEALTH QUESTIONNAIRE - PHQ9
SUM OF ALL RESPONSES TO PHQ QUESTIONS 1-9: 3
10. IF YOU CHECKED OFF ANY PROBLEMS, HOW DIFFICULT HAVE THESE PROBLEMS MADE IT FOR YOU TO DO YOUR WORK, TAKE CARE OF THINGS AT HOME, OR GET ALONG WITH OTHER PEOPLE: SOMEWHAT DIFFICULT
SUM OF ALL RESPONSES TO PHQ QUESTIONS 1-9: 3

## 2019-10-18 ASSESSMENT — ANXIETY QUESTIONNAIRES
6. BECOMING EASILY ANNOYED OR IRRITABLE: MORE THAN HALF THE DAYS
1. FEELING NERVOUS, ANXIOUS, OR ON EDGE: SEVERAL DAYS
3. WORRYING TOO MUCH ABOUT DIFFERENT THINGS: SEVERAL DAYS
GAD7 TOTAL SCORE: 8
7. FEELING AFRAID AS IF SOMETHING AWFUL MIGHT HAPPEN: SEVERAL DAYS
4. TROUBLE RELAXING: SEVERAL DAYS
GAD7 TOTAL SCORE: 8
2. NOT BEING ABLE TO STOP OR CONTROL WORRYING: SEVERAL DAYS
GAD7 TOTAL SCORE: 8
5. BEING SO RESTLESS THAT IT IS HARD TO SIT STILL: SEVERAL DAYS
7. FEELING AFRAID AS IF SOMETHING AWFUL MIGHT HAPPEN: SEVERAL DAYS

## 2019-10-18 NOTE — PROGRESS NOTES
Subjective     Kolby Funk is a 38 year old male who presents to clinic today for the following health issues:    History of Present Illness        Mental Health Follow-up:  Patient presents to follow-up on Anxiety.    Patient's anxiety since last visit has been:  Worse  The patient is not having other symptoms associated with anxiety.  Any significant life events: No  Patient is feeling anxious or having panic attacks.  Patient has no concerns about alcohol or drug use.     Social History  Tobacco Use    Smoking status: Former Smoker    Smokeless tobacco: Never Used  Alcohol use: Yes    Alcohol/week: 0.0 standard drinks    Comment: weekends  Drug use: No      Today's PHQ-9         PHQ-9 Total Score:     (P) 3   PHQ-9 Q9 Thoughts of better off dead/self-harm past 2 weeks :   (P) Not at all   Thoughts of suicide or self harm:      Self-harm Plan:        Self-harm Action:          Safety concerns for self or others:           He eats 2-3 servings of fruits and vegetables daily.He consumes 1 sweetened beverage(s) daily.  He is taking medications regularly.     Answers for HPI/ROS submitted by the patient on 10/18/2019   Chronic problems general questions HPI Form  NICHOLAS 7 TOTAL SCORE: 8  If you checked off any problems, how difficult have these problems made it for you to do your work, take care of things at home, or get along with other people?: Somewhat difficult  PHQ9 TOTAL SCORE: 3    I had him increase his fluoxetine to 60mg daily about two weeks ago, doesn't know if this has been helpful as he's generally been avoiding being out in public, crowded places.  Having more issues being out in public.  OK at home and at work, any place else is stressful.  Does have a conference away next week and is a little nervous about this.  Airport is a bigger concern than flying.  Has never done mood based counseling.    Daughter does also struggle with anxiety.            Review of Systems   Constitutional: Positive for  diaphoresis.   Cardiovascular: Positive for palpitations.   Psychiatric/Behavioral: Negative for decreased concentration, dysphoric mood and sleep disturbance. The patient is nervous/anxious.         Objective    /72 (BP Location: Right arm, Patient Position: Chair, Cuff Size: Adult Regular)   Pulse 78   Temp 98.2  F (36.8  C) (Oral)   Resp 12   Wt 98 kg (216 lb)   SpO2 97%   BMI 32.60 kg/m    Body mass index is 32.6 kg/m .  Physical Exam  Vitals signs reviewed.   Neck:      Thyroid: No thyromegaly.   Cardiovascular:      Rate and Rhythm: Normal rate and regular rhythm.      Heart sounds: Normal heart sounds.   Pulmonary:      Effort: Pulmonary effort is normal.      Breath sounds: Normal breath sounds.   Skin:     General: Skin is warm and dry.   Neurological:      Mental Status: He is alert and oriented to person, place, and time.   Psychiatric:         Behavior: Behavior normal.        Assessment and Plan    (Z23) Need for prophylactic vaccination and inoculation against influenza  (primary encounter diagnosis)  Comment:   Plan: INFLUENZA VACCINE IM > 6 MONTHS VALENT IIV4         [31162], Vaccine Administration, Initial         [64904]            (F41.1) NICHOLAS (generalized anxiety disorder)  Comment: providing a PRN.  Refilling at dose increase.  Advised to look into CBT for additional support with panic attacks and agorphobia.  Will be able to see how increased dose and PRN works with upcoming business trip.  F/u PRN  Plan: FLUoxetine (PROZAC) 20 MG capsule, LORazepam         (ATIVAN) 0.5 MG tablet              RTC in 6m, unless needing help more acutely again    Abraham Cardoza MD

## 2019-10-19 ASSESSMENT — ANXIETY QUESTIONNAIRES: GAD7 TOTAL SCORE: 8

## 2019-10-19 ASSESSMENT — PATIENT HEALTH QUESTIONNAIRE - PHQ9: SUM OF ALL RESPONSES TO PHQ QUESTIONS 1-9: 3

## 2019-10-24 DIAGNOSIS — F41.1 GAD (GENERALIZED ANXIETY DISORDER): ICD-10-CM

## 2019-10-24 NOTE — TELEPHONE ENCOUNTER
"Current med list is 20MG 3 caps a day.   Disp Refills Start End BELL   FLUoxetine (PROZAC) 20 MG capsule 270 capsule 1 10/18/2019  No   Sig - Route: Take 3 capsules (60 mg) by mouth daily TAKE 1 CAPSULE (40 MG) BY MOUTH DAILY - Oral         Requested Prescriptions   Pending Prescriptions Disp Refills     FLUoxetine (PROZAC) 40 MG capsule [Pharmacy Med Name: FLUOXETINE HCL 40 MG CAPSULE] 90 capsule 1     Sig: TAKE 1 CAPSULE BY MOUTH EVERY DAY    (Discontinued)      Last Written Prescription Date:  4/30/19    END:10/18/19  Reason for Discontinue: None    Last Fill Quantity: 90,   # refills: 1  [facility] with [facility] or [facility] prescribing provider: 10/18/2019  Future Office Visit:           SSRIs Protocol Passed - 10/24/2019  1:34 AM   PHQ-9 SCORE 7/19/2016 4/30/2019 10/18/2019   PHQ-9 Total Score MyChart - 5 (Mild depression) 3 (Minimal depression)   PHQ-9 Total Score 14 5 3     NICHOLAS-7 SCORE 5/22/2018 4/30/2019 10/18/2019   Total Score - 12 (moderate anxiety) 8 (mild anxiety)   Total Score 15 12 8          Passed - Recent (12 mo) or future (30 days) visit within the authorizing provider's specialty     Patient has had an office visit with the authorizing provider or a provider within the authorizing providers department within the previous 12 mos or has a future within next 30 days. See \"Patient Info\" tab in inbasket, or \"Choose Columns\" in Meds & Orders section of the refill encounter.              Passed - Medication is active on med list        Passed - Patient is age 18 or older        "

## 2019-10-25 NOTE — TELEPHONE ENCOUNTER
(F41.1) NICHOLAS (generalized anxiety disorder)  Comment: providing a PRN.  Refilling at dose increase.  Advised to look into CBT for additional support with panic attacks and agorphobia.  Will be able to see how increased dose and PRN works with upcoming business trip.  F/u PRN  Plan: FLUoxetine (PROZAC) 20 MG capsule, LORazepam         (ATIVAN) 0.5 MG tablet           Disp Refills Start End BELL   FLUoxetine (PROZAC) 20 MG capsule 270 capsule 1 10/18/2019  No   Sig - Route: Take 3 capsules (60 mg) by mouth daily TAKE 1 CAPSULE (40 MG) BY MOUTH DAILY - Oral   Sent to pharmacy as: FLUoxetine (PROZAC) 20 MG capsule   Class: E-Prescribe      Disp Refills Start End BELL   FLUoxetine (PROZAC) 40 MG capsule (Discontinued) 90 capsule 1 4/30/2019 10/18/2019 No   Sig: TAKE 1 CAPSULE (40 MG) BY MOUTH DAILY   Sent to pharmacy as: FLUoxetine (PROZAC) 40 MG capsule   Class: E-Prescribe     Approved corrected instructions

## 2019-11-18 DIAGNOSIS — F41.1 GAD (GENERALIZED ANXIETY DISORDER): ICD-10-CM

## 2019-11-18 NOTE — TELEPHONE ENCOUNTER
"Requested Prescriptions   Pending Prescriptions Disp Refills     FLUoxetine (PROZAC) 20 MG capsule [Pharmacy Med Name: FLUOXETINE HCL 20 MG CAPSULE] 90 capsule 0     Sig: TAKE 3 CAPSULES BY MOUTH EVERY DAY   Last Written Prescription Date:  10/25/19  Last Fill Quantity: 90,  # refills: 0   Last Office Visit: 10/18/2019 Nani      Return in about 6 months (around 4/18/2020).     Future Office Visit:         SSRIs Protocol Passed - 11/18/2019 12:31 PM   PHQ-9 SCORE 7/19/2016 4/30/2019 10/18/2019   PHQ-9 Total Score MyChart - 5 (Mild depression) 3 (Minimal depression)   PHQ-9 Total Score 14 5 3     NICHOLAS-7 SCORE 5/22/2018 4/30/2019 10/18/2019   Total Score - 12 (moderate anxiety) 8 (mild anxiety)   Total Score 15 12 8          Passed - Recent (12 mo) or future (30 days) visit within the authorizing provider's specialty     Patient has had an office visit with the authorizing provider or a provider within the authorizing providers department within the previous 12 mos or has a future within next 30 days. See \"Patient Info\" tab in inbasket, or \"Choose Columns\" in Meds & Orders section of the refill encounter.              Passed - Medication is active on med list        Passed - Patient is age 18 or older        "

## 2019-11-20 NOTE — TELEPHONE ENCOUNTER
Duplicate. Pt has refills remaining at pharmacy. Denial sent with note to pharmacy requesting they review and fill medication.     Amelia Tai RN -- [facility] Workforce

## 2019-11-21 DIAGNOSIS — F41.1 GAD (GENERALIZED ANXIETY DISORDER): ICD-10-CM

## 2019-11-21 NOTE — TELEPHONE ENCOUNTER
"Requested Prescriptions   Pending Prescriptions Disp Refills     FLUoxetine (PROZAC) 20 MG capsule [Pharmacy Med Name: FLUOXETINE HCL 20 MG CAPSULE] 90 capsule 0     Sig: TAKE 3 CAPSULES BY MOUTH EVERY DAY   Last Written Prescription Date:  10/25/19  Last Fill Quantity: 90,  # refills: 0   Last Office Visit: 10/18/2019 Nani      Return in about 6 months (around 4/18/2020).     Future Office Visit:         SSRIs Protocol Passed - 11/21/2019 11:35 AM   PHQ-9 SCORE 7/19/2016 4/30/2019 10/18/2019   PHQ-9 Total Score MyChart - 5 (Mild depression) 3 (Minimal depression)   PHQ-9 Total Score 14 5 3     NICHOLAS-7 SCORE 5/22/2018 4/30/2019 10/18/2019   Total Score - 12 (moderate anxiety) 8 (mild anxiety)   Total Score 15 12 8          Passed - Recent (12 mo) or future (30 days) visit within the authorizing provider's specialty     Patient has had an office visit with the authorizing provider or a provider within the authorizing providers department within the previous 12 mos or has a future within next 30 days. See \"Patient Info\" tab in inbasket, or \"Choose Columns\" in Meds & Orders section of the refill encounter.              Passed - Medication is active on med list        Passed - Patient is age 18 or older        "

## 2019-11-22 NOTE — TELEPHONE ENCOUNTER
Routing refill request to provider for review/approval because:  NICHOLAS >4  PHQ-9 SCORE 7/19/2016 4/30/2019 10/18/2019   PHQ-9 Total Score MyChart - 5 (Mild depression) 3 (Minimal depression)   PHQ-9 Total Score 14 5 3     NICHOLAS-7 SCORE 5/22/2018 4/30/2019 10/18/2019   Total Score - 12 (moderate anxiety) 8 (mild anxiety)   Total Score 15 12 8

## 2019-12-31 ENCOUNTER — OFFICE VISIT (OUTPATIENT)
Dept: URGENT CARE | Facility: URGENT CARE | Age: 39
End: 2019-12-31
Payer: COMMERCIAL

## 2019-12-31 VITALS
TEMPERATURE: 98.1 F | RESPIRATION RATE: 16 BRPM | HEIGHT: 68 IN | HEART RATE: 90 BPM | DIASTOLIC BLOOD PRESSURE: 94 MMHG | SYSTOLIC BLOOD PRESSURE: 157 MMHG | BODY MASS INDEX: 30.31 KG/M2 | WEIGHT: 200 LBS

## 2019-12-31 DIAGNOSIS — J02.9 SORETHROAT: Primary | ICD-10-CM

## 2019-12-31 LAB
DEPRECATED S PYO AG THROAT QL EIA: NORMAL
SPECIMEN SOURCE: NORMAL

## 2019-12-31 PROCEDURE — 87880 STREP A ASSAY W/OPTIC: CPT | Performed by: FAMILY MEDICINE

## 2019-12-31 PROCEDURE — 99213 OFFICE O/P EST LOW 20 MIN: CPT | Performed by: FAMILY MEDICINE

## 2019-12-31 PROCEDURE — 87081 CULTURE SCREEN ONLY: CPT | Performed by: FAMILY MEDICINE

## 2019-12-31 RX ORDER — AZITHROMYCIN 250 MG/1
TABLET, FILM COATED ORAL
Qty: 6 TABLET | Refills: 0 | Status: SHIPPED | OUTPATIENT
Start: 2019-12-31 | End: 2020-01-05

## 2019-12-31 ASSESSMENT — MIFFLIN-ST. JEOR: SCORE: 1783.75

## 2019-12-31 NOTE — PROGRESS NOTES
SUBJECTIVE: 39 year old male with sore throat, myalgias, swollen glands, headache and fever for several days. No history of rheumatic fever. Other symptoms: none. 3 kids at home with strep.      OBJECTIVE:   Vitals as noted above.  Appears mild distress.  Ears: normal  Oropharynx: moderate erythema  Neck: supple and moderate nontender anterior cervical nodes  Lungs: clear to IPPA  Rapid Strep test is negative    ASSESSMENT: Streptococcal pharyngitis    PLAN: Per orders. Gargle, use acetaminophen or other OTC analgesic, and take Rx fully as prescribed. Call if other family members develop similar symptoms. See prn.

## 2020-01-02 LAB
BACTERIA SPEC CULT: NORMAL
SPECIMEN SOURCE: NORMAL

## 2020-03-10 ENCOUNTER — HEALTH MAINTENANCE LETTER (OUTPATIENT)
Age: 40
End: 2020-03-10

## 2020-08-17 DIAGNOSIS — F41.1 GAD (GENERALIZED ANXIETY DISORDER): ICD-10-CM

## 2020-08-17 NOTE — TELEPHONE ENCOUNTER
Prescription [place] per [facility] [place].    Milad Ferreira RN

## 2020-09-24 ENCOUNTER — MYC REFILL (OUTPATIENT)
Dept: FAMILY MEDICINE | Facility: CLINIC | Age: 40
End: 2020-09-24

## 2020-09-24 DIAGNOSIS — F41.1 GAD (GENERALIZED ANXIETY DISORDER): ICD-10-CM

## 2020-09-24 NOTE — TELEPHONE ENCOUNTER
Prescription approved per [facility] Refill Protocol.    Milad Ferreira RN

## 2020-09-28 ENCOUNTER — MYC REFILL (OUTPATIENT)
Dept: FAMILY MEDICINE | Facility: CLINIC | Age: 40
End: 2020-09-28

## 2020-09-28 DIAGNOSIS — F41.1 GAD (GENERALIZED ANXIETY DISORDER): ICD-10-CM

## 2020-09-29 NOTE — TELEPHONE ENCOUNTER
Medication is being filled for 1 time refill only due to:  Patient needs to be seen because patient due for visit.     Wormhole message sent to patient to schedule appt    Ramone BLANK RN, BSN

## 2020-11-05 ENCOUNTER — ALLIED HEALTH/NURSE VISIT (OUTPATIENT)
Dept: FAMILY MEDICINE | Facility: CLINIC | Age: 40
End: 2020-11-05
Payer: COMMERCIAL

## 2020-11-05 DIAGNOSIS — Z23 NEED FOR PROPHYLACTIC VACCINATION AND INOCULATION AGAINST INFLUENZA: Primary | ICD-10-CM

## 2020-11-05 PROCEDURE — 99207 PR NO CHARGE NURSE ONLY: CPT

## 2020-11-05 PROCEDURE — 90686 IIV4 VACC NO PRSV 0.5 ML IM: CPT

## 2020-11-05 PROCEDURE — 90471 IMMUNIZATION ADMIN: CPT

## 2021-02-15 ENCOUNTER — VIRTUAL VISIT (OUTPATIENT)
Dept: FAMILY MEDICINE | Facility: CLINIC | Age: 41
End: 2021-02-15
Payer: COMMERCIAL

## 2021-02-15 DIAGNOSIS — F41.1 GAD (GENERALIZED ANXIETY DISORDER): ICD-10-CM

## 2021-02-15 PROCEDURE — 99213 OFFICE O/P EST LOW 20 MIN: CPT | Mod: 95 | Performed by: FAMILY MEDICINE

## 2021-02-15 ASSESSMENT — ENCOUNTER SYMPTOMS
CONSTITUTIONAL NEGATIVE: 1
SLEEP DISTURBANCE: 0
ABDOMINAL PAIN: 0
PALPITATIONS: 0
NERVOUS/ANXIOUS: 0
HEADACHES: 0

## 2021-02-15 ASSESSMENT — ANXIETY QUESTIONNAIRES
6. BECOMING EASILY ANNOYED OR IRRITABLE: MORE THAN HALF THE DAYS
3. WORRYING TOO MUCH ABOUT DIFFERENT THINGS: MORE THAN HALF THE DAYS
5. BEING SO RESTLESS THAT IT IS HARD TO SIT STILL: SEVERAL DAYS
2. NOT BEING ABLE TO STOP OR CONTROL WORRYING: MORE THAN HALF THE DAYS
GAD7 TOTAL SCORE: 12
IF YOU CHECKED OFF ANY PROBLEMS ON THIS QUESTIONNAIRE, HOW DIFFICULT HAVE THESE PROBLEMS MADE IT FOR YOU TO DO YOUR WORK, TAKE CARE OF THINGS AT HOME, OR GET ALONG WITH OTHER PEOPLE: SOMEWHAT DIFFICULT
7. FEELING AFRAID AS IF SOMETHING AWFUL MIGHT HAPPEN: SEVERAL DAYS
1. FEELING NERVOUS, ANXIOUS, OR ON EDGE: MORE THAN HALF THE DAYS

## 2021-02-15 ASSESSMENT — PATIENT HEALTH QUESTIONNAIRE - PHQ9
SUM OF ALL RESPONSES TO PHQ QUESTIONS 1-9: 7
5. POOR APPETITE OR OVEREATING: MORE THAN HALF THE DAYS

## 2021-02-15 NOTE — PROGRESS NOTES
"Hardeep is a 40 year old who is being evaluated via a billable telephone visit.      What phone number would you like to be contacted at? ***  How would you like to obtain your AVS? {AVS Preference:319939}    {PROVIDER CHARTING PREFERENCE:698065}    Subjective   Hardeep is a 40 year old who presents for the following health issues {ACCOMPANIED BY STATEMENT (Optional):677055}    HPI      Anxiety Follow-Up    How are you doing with your anxiety since your last visit? { :438376::\"No change\"}    Are you having other symptoms that might be associated with anxiety? { :205978}    Have you had a significant life event? { :586982}     Are you feeling depressed? { :537900}    Do you have any concerns with your use of alcohol or other drugs? { :562865}    Social History     Tobacco Use     Smoking status: Former Smoker     Smokeless tobacco: Never Used   Substance Use Topics     Alcohol use: Yes     Alcohol/week: 0.0 standard drinks     Comment: weekends     Drug use: No     NICHOLAS-7 SCORE 5/22/2018 4/30/2019 10/18/2019   Total Score - 12 (moderate anxiety) 8 (mild anxiety)   Total Score 15 12 8     PHQ 7/19/2016 4/30/2019 10/18/2019   PHQ-9 Total Score 14 5 3   Q9: Thoughts of better off dead/self-harm past 2 weeks Not at all Not at all Not at all     {Last PHQ9 or GAD7 Responses (Optional):591749}      How many servings of fruits and vegetables do you eat daily?  { :206803}    On average, how many sweetened beverages do you drink each day (Examples: soda, juice, sweet tea, etc.  Do NOT count diet or artificially sweetened beverages)?   { 1-11:917335}    How many days per week do you exercise enough to make your heart beat faster? { :935021}    How many minutes a day do you exercise enough to make your heart beat faster? { :052922}    How many days per week do you miss taking your medication? {0-7 :727218}    {additonal problems for provider to add (Optional):793880}    Review of Systems   {ROS COMP (Optional):965117}      Objective     " "      Vitals:  No vitals were obtained today due to virtual visit.    Physical Exam   {GENERAL APPEARANCE:50::\"healthy\",\"alert\",\"no distress\"}  PSYCH: Alert and oriented times 3; coherent speech, normal   rate and volume, able to articulate logical thoughts, able   to abstract reason, no tangential thoughts, no hallucinations   or delusions  His affect is { :8371093::\"normal\"}  RESP: No cough, no audible wheezing, able to talk in full sentences  Remainder of exam unable to be completed due to telephone visits    {Diagnostic Test Results (Optional):715743}    {AMBULATORY ATTESTATION (Optional):820476}        Phone call duration: *** minutes  "

## 2021-02-15 NOTE — PROGRESS NOTES
Hardeep is a 40 year old who is being evaluated via a billable telephone visit.      What phone number would you like to be contacted at? [phone]  How would you like to obtain your AVS? [facility]    Assessment and Plan    (F41.1) NICHOLAS (generalized anxiety disorder)  Comment: mood stable, refilling  Plan: FLUoxetine (PROZAC) 20 MG capsule              RTC in 6m for CPE    Abraham Cardoza MD      Subjective   Hardeep is a 40 year old who presents for the following health issues     HPI     Anxiety Follow-Up    How are you doing with your anxiety since your last visit? No change-States its stable but room for improvement    Are you having other symptoms that might be associated with anxiety? No    Have you had a significant life event? No     Are you feeling depressed? No    Do you have any concerns with your use of alcohol or other drugs? No    Social History     Tobacco Use     Smoking status: Former Smoker     Smokeless tobacco: Never Used   Substance Use Topics     Alcohol use: Yes     Alcohol/week: 0.0 standard drinks     Comment: weekends     Drug use: No     NICHOLAS-7 SCORE 5/22/2018 4/30/2019 10/18/2019   Total Score - 12 (moderate anxiety) 8 (mild anxiety)   Total Score 15 12 8     PHQ 7/19/2016 4/30/2019 10/18/2019   PHQ-9 Total Score 14 5 3   Q9: Thoughts of better off dead/self-harm past 2 weeks Not at all Not at all Not at all     Last PHQ-9 2/15/2021   1.  Little interest or pleasure in doing things 1   2.  Feeling down, depressed, or hopeless 0   3.  Trouble falling or staying asleep, or sleeping too much 2   4.  Feeling tired or having little energy 2   5.  Poor appetite or overeating 0   6.  Feeling bad about yourself 0   7.  Trouble concentrating 2   8.  Moving slowly or restless 0   Q9: Thoughts of better off dead/self-harm past 2 weeks 0   PHQ-9 Total Score 7   Difficulty at work, home, or with people Somewhat difficult     NICHOLAS-7  2/15/2021   1. Feeling nervous, anxious, or on edge 2   2. Not being able to stop  or control worrying 2   3. Worrying too much about different things 2   4. Trouble relaxing 2   5. Being so restless that it is hard to sit still 1   6. Becoming easily annoyed or irritable 2   7. Feeling afraid, as if something awful might happen 1   NICHOLAS-7 Total Score 12   If you checked any problems, how difficult have they made it for you to do your work, take care of things at home, or get along with other people? Somewhat difficult       Feels mood is manageable.  Sleep is variable and seems to link to busyness at work.  No worrisome side effects.  Is not taking a vitamin D.  Recently ran out of medication.    Review of Systems   Constitutional: Negative.    Cardiovascular: Negative for palpitations.   Gastrointestinal: Negative for abdominal pain.   Neurological: Negative for headaches.   Psychiatric/Behavioral: Negative for sleep disturbance. The patient is not nervous/anxious.             Objective           Vitals:  No vitals were obtained today due to virtual visit.    Physical Exam   healthy, alert and no distress  PSYCH: Alert and oriented times 3; coherent speech, normal   rate and volume, able to articulate logical thoughts, able   to abstract reason, no tangential thoughts, no hallucinations   or delusions  His affect is normal  RESP: No cough, no audible wheezing, able to talk in full sentences  Remainder of exam unable to be completed due to telephone visits                Phone call duration: 7 minutes

## 2021-02-16 ASSESSMENT — ANXIETY QUESTIONNAIRES: GAD7 TOTAL SCORE: 12

## 2021-04-24 ENCOUNTER — HEALTH MAINTENANCE LETTER (OUTPATIENT)
Age: 41
End: 2021-04-24

## 2021-08-15 DIAGNOSIS — F41.1 GAD (GENERALIZED ANXIETY DISORDER): ICD-10-CM

## 2021-08-16 NOTE — TELEPHONE ENCOUNTER
Medication is being filled for 1 time refill only due to:  to get to scheduled appt    Next 5 appointments (look out 90 days)    Oct 12, 2021 10:40 AM  PHYSICAL with Abraham Cardoza MD  [facility] ([facility] [address] [facility] [address] [phone]        Roseanna NIXON RN

## 2021-10-03 ENCOUNTER — HEALTH MAINTENANCE LETTER (OUTPATIENT)
Age: 41
End: 2021-10-03

## 2021-11-07 DIAGNOSIS — F41.1 GAD (GENERALIZED ANXIETY DISORDER): ICD-10-CM

## 2021-11-10 NOTE — TELEPHONE ENCOUNTER
Patient has an upcoming appointment with Dr. Cardoza on 12/6/2021 at 9:40am.  Will give refill to get patient through.    Mikki Villafana RN

## 2021-11-11 ENCOUNTER — OFFICE VISIT (OUTPATIENT)
Dept: URGENT CARE | Facility: URGENT CARE | Age: 41
End: 2021-11-11
Payer: COMMERCIAL

## 2021-11-11 VITALS
TEMPERATURE: 98.8 F | BODY MASS INDEX: 32.41 KG/M2 | SYSTOLIC BLOOD PRESSURE: 148 MMHG | HEART RATE: 92 BPM | RESPIRATION RATE: 16 BRPM | DIASTOLIC BLOOD PRESSURE: 96 MMHG | OXYGEN SATURATION: 95 % | WEIGHT: 210 LBS

## 2021-11-11 DIAGNOSIS — K12.2 UVULITIS: ICD-10-CM

## 2021-11-11 DIAGNOSIS — R07.0 THROAT PAIN: Primary | ICD-10-CM

## 2021-11-11 LAB
DEPRECATED S PYO AG THROAT QL EIA: NEGATIVE
GROUP A STREP BY PCR: NOT DETECTED

## 2021-11-11 PROCEDURE — U0005 INFEC AGEN DETEC AMPLI PROBE: HCPCS | Performed by: FAMILY MEDICINE

## 2021-11-11 PROCEDURE — U0003 INFECTIOUS AGENT DETECTION BY NUCLEIC ACID (DNA OR RNA); SEVERE ACUTE RESPIRATORY SYNDROME CORONAVIRUS 2 (SARS-COV-2) (CORONAVIRUS DISEASE [COVID-19]), AMPLIFIED PROBE TECHNIQUE, MAKING USE OF HIGH THROUGHPUT TECHNOLOGIES AS DESCRIBED BY CMS-2020-01-R: HCPCS | Performed by: FAMILY MEDICINE

## 2021-11-11 PROCEDURE — 99213 OFFICE O/P EST LOW 20 MIN: CPT | Performed by: FAMILY MEDICINE

## 2021-11-11 PROCEDURE — 87651 STREP A DNA AMP PROBE: CPT | Performed by: FAMILY MEDICINE

## 2021-11-11 RX ORDER — AMOXICILLIN 875 MG
875 TABLET ORAL 2 TIMES DAILY
Qty: 20 TABLET | Refills: 0 | Status: SHIPPED | OUTPATIENT
Start: 2021-11-11 | End: 2021-11-21

## 2021-11-11 NOTE — PROGRESS NOTES
ASSESSMENT/  PLAN:  Throat pain     - Streptococcus A Rapid Screen w/Reflex to PCR  - Symptomatic COVID-19 Virus (Coronavirus) by PCR Nose  - Group A Streptococcus PCR Throat Swab      discussed with the patient that a confirmatory strep culture will be performed and that he will be called if the culture is positive for strep.  We discussed other possible causes of pharyngitis including cold viruses , Covid 19 and mononucleosis.   The limitations of the mono test was discussed and that late and/or repeated testing is sometimes necessary when mononucleosis is the cause of the illness    If the test for Covid 19 is positive will need to quarantine at least 10 days and may return to work/ school if fever free at least 1 day     Symptomatic treat with gargles, lozenges, and OTC analgesic as needed. Follow-up with primary clinic if not improving.      Uvulitis     - amoxicillin (AMOXIL) 875 MG tablet; Take 1 tablet (875 mg) by mouth 2 times daily for 10 days    Uvula is swollen and erythematous-  Will treat with antibiotic-  Symptomatic treatment with acetaminophen or Ibuprofen as needed for pain and for fever.         --------------------------------------------------------------------------------------------------------------------------------    SUBJECTIVE:  Chief Complaint   Patient presents with     Pharyngitis     swollen glands this morning, unable to swallow, breath, talk normal     Kolby Funk is a 40 year old male   with a chief complaint of sore throat.  Onset of symptoms was 1 day(s) ago.    Course of illness: sudden onset, still present and constant.  Severity moderate  Current and Associated symptoms: fever, sore throat, headache, body aches and fatigue  Treatment measures tried include Tylenol/Ibuprofen.  Predisposing factors include ill contact: Family member .-  Wife has mono-like illness    Past Medical History:   Diagnosis Date     Anxiety      Patient Active Problem List   Diagnosis      Tobacco abuse     Urethral diverticulum     NICHOLAS (generalized anxiety disorder)         ALLERGIES:  Patient has no known allergies.    MEDs  FLUoxetine (PROZAC) 20 MG capsule, TAKE 3 CAPSULES (60 MG) BY MOUTH DAILY  LORazepam (ATIVAN) 0.5 MG tablet, Take 1 tablet (0.5 mg) by mouth every 6 hours as needed for anxiety    No current facility-administered medications on file prior to visit.      Social History     Tobacco Use     Smoking status: Former Smoker     Smokeless tobacco: Never Used   Substance Use Topics     Alcohol use: Yes     Alcohol/week: 0.0 standard drinks     Comment: weekends       Family History   Problem Relation Age of Onset     Cerebrovascular Disease Father          ROS:  CONSTITUTIONAL:NEGATIVE for fever, chills, change in weight  INTEGUMENTARY/SKIN: NEGATIVE for worrisome rashes, moles or lesions  EYES: NEGATIVE for vision changes or irritation  RESP:NEGATIVE for significant cough or SOB    OBJECTIVE:   BP (!) 148/96 (BP Location: Right arm, Patient Position: Sitting, Cuff Size: Adult Large)   Pulse 92   Temp 98.8  F (37.1  C) (Oral)   Resp 16   Wt 95.3 kg (210 lb)   SpO2 95%   BMI 32.41 kg/m    GENERAL APPEARANCE:   alert and moderate distress  EYES: EOMI,  PERRL, conjunctiva clear  HENT: ear canals and TM's normal.  Nose normal.  Pharynx erythematous with some exudate noted.-  Uvula swollen and erythematous  NECK: supple, non-tender to palpation, no adenopathy noted  RESP: lungs clear to auscultation - no rales, rhonchi or wheezes  CV: regular rates and rhythm, normal S1 S2, no murmur noted  ABDOMEN:  soft, nontender, no HSM or masses and bowel sounds normal  SKIN: no suspicious lesions or rashes    Results for orders placed or performed in visit on 11/11/21   Streptococcus A Rapid Screen w/Reflex to PCR     Status: Normal    Specimen: Throat; Swab   Result Value Ref Range    Group A Strep antigen Negative Negative

## 2021-11-11 NOTE — PATIENT INSTRUCTIONS
Patient Education     Uvulitis    The uvula is the tissue that hangs in the back of the throat. Uvulitis is inflammation of the uvula. Inflammation happens when the body responds to an injury, allergic reaction, infection or illness. Symptoms of inflammation may include redness, irritation, itching, swelling, or burning. Uvulitis is more common in children than adults.   Symptoms of uvulitis include:    Sore throat    Trouble swallowing    Painful swallowing    Trouble breathing  Possible causes of uvulitis include:    Throat infection    Inhaling or swallowing chemicals     Inhaling hot air or steam    Allergic reaction to something eaten, touched or breathed in  In rare cases, uvulitis can be caused by a condition called angioedema. Angioendema can be:     Hereditary (runs in the family)    A side effect of a class of drugs used for high blood pressure called ACE inhibitors    Life-threatening. It can lead to swelling of the air passage in the mouth or throat. Severe swelling can block your breathing and cause death. Watch for the earliest signs of this illness. Call 911 if the swelling involves the face, mouth, or throat areas.  To help find the cause of the uvulitis, imaging tests may be done. If infection is suspected, swabs from the throat or samples of blood may be tested. Questions may be asked about an individual s vaccination history to be sure it is up to date. A cause for uvulitis is not always found.   Treatment depends on the cause and the severity of the symptoms.  Home care  Medicines  The doctor may prescribe antibiotics for an infection. For an allergic reaction or angioedema, medicines called steroids or antihistamines may be given. Follow instructions when using any medicine.   General care  To care for the condition at home:    Rest until the symptoms go away.    If medicines were prescribed, be sure they are taken as directed. They should be taken until they are gone or the healthcare  provider says to stop them.    If you were told that your angioedema was from a medicine that you are taking, you must stop taking this medicine. Contact your doctor for a prescription for a different medicine. Advise future medical providers that you are allergic to this medicine.    Contact your healthcare provider before taking any over-the-counter medicines.    Drink fluids. Pain when swallowing may make it harder to drink and lead to dehydration. To prevent this, sip fluids throughout the day. Children can be given frozen juice bars, milk, or other cold liquids. Watch for the signs of dehydration listed below.    Ask your healthcare provider when you can return to work or school. Ask your child s healthcare provider when your child can return to school or .  Follow-up care  Follow up with the healthcare provider, or as directed. You may be referred to an allergy doctor or an ear, nose, or throat (ENT) doctor for further evaluation and treatment. Make these visits as soon as possible.   When to seek medical advice  Call the healthcare provider right away for any of the following:    Symptoms not going away or getting worse    Symptoms of dehydration, including dark urine, dry mouth, cracked lips, dizziness, or sunken eyes    A child acting very sick or not improving    Fever over 100.4 F (38 C) in adults, or as directed by your healthcare provider    Fever in children (see Fever and children, below)  Call 911  Call 911 if any of these occur:     Drooling or trouble swallowing    Trouble breathing    Trouble talking    Swollen tongue, lips, face, or throat. You can tell this if your child's voice changes.    Jerking and loss of consciousness; seizure    Lack of response, extreme drowsiness, or trouble waking up    Fainting    Confusion    Child being unresponsive    Skin or lips look blue, purple, or gray  Fever and children  Use a digital thermometer to check your child s temperature. Don t use a mercury  thermometer. There are different kinds and uses of digital thermometers. They include:     Rectal. For children younger than 3 years, a rectal temperature is the most accurate.    Forehead (temporal). This works for children age 3 months and older. If a child under 3 months old has signs of illness, this can be used for a first pass. The provider may want to confirm with a rectal temperature.    Ear (tympanic). Ear temperatures are accurate after 6 months of age, but not before.    Armpit (axillary). This is the least reliable but may be used for a first pass to check a child of any age with signs of illness. The provider may want to confirm with a rectal temperature.    Mouth (oral). Don t use a thermometer in your child s mouth until he or she is at least 4 years old.  Use the rectal thermometer with care. Follow the product maker s directions for correct use. Insert it gently. Label it and make sure it s not used in the mouth. It may pass on germs from the stool. If you don t feel OK using a rectal thermometer, ask the healthcare provider what type to use instead. When you talk with any healthcare provider about your child s fever, tell him or her which type you used.   Below are guidelines to know if your young child has a fever. Your child s healthcare provider may give you different numbers for your child. Follow your provider s specific instructions.   Fever readings for a baby under 3 months old:     First, ask your child s healthcare provider how you should take the temperature.    Rectal or forehead: 100.4 F (38 C) or higher    Armpit: 99 F (37.2 C) or higher  Fever readings for a child age 3 months to 36 months (3 years):     Rectal, forehead, or ear: 102 F (38.9 C) or higher    Armpit: 101 F (38.3 C) or higher  Call the healthcare provider in these cases:    Repeated temperature of 104 F (40 C) or higher in a child of any age    Fever of 100.4  F (38  C) or higher in baby younger than 3 months    Fever  that lasts more than 24 hours in a child under age 2    Fever that lasts for 3 days in a child age 2 or older  Francy last reviewed this educational content on 5/1/2018 2000-2021 [employer]. All rights reserved. This information is not intended as a substitute for professional medical care. Always follow your healthcare professional's instructions.

## 2021-11-12 LAB — SARS-COV-2 RNA RESP QL NAA+PROBE: NEGATIVE

## 2022-01-28 DIAGNOSIS — F41.1 GAD (GENERALIZED ANXIETY DISORDER): ICD-10-CM

## 2022-02-01 NOTE — TELEPHONE ENCOUNTER
Medication is being filled for 1 time refill only due to:  Patient needs to be seen because due for an appt for a px / med check. - this is for fluoxetine.      Will forward to the station, please try to help the pt schedule an appt for a physical / med check.  Thanks!

## 2022-05-07 DIAGNOSIS — F41.1 GAD (GENERALIZED ANXIETY DISORDER): ICD-10-CM

## 2022-05-07 NOTE — LETTER
May 23, 2022      Kolby Funk  [address] [place] [id number]        Juanis Meier,     We recently received a call from your pharmacy requesting a refill of your medication FLUoxetine. We are contacting you today to notify you that you are due for a MEDICATION CHECK for further refills.     We have authorized a one time refill of your medication to allow time for you to schedule an appointment. This appointment can be in clinic or virtual, by telephone or video.     Please call [phone] to schedule an appointment or if you have MyChart you can schedule with your provider as well.     Taking care of your health is important to us, and ongoing visits with your provider are vital to your care. We look forward to seeing you in the near future.     Thank you for using [facility] for your medical needs.       Sincerely,        Abraham Cardoza MD

## 2022-05-15 ENCOUNTER — HEALTH MAINTENANCE LETTER (OUTPATIENT)
Age: 42
End: 2022-05-15

## 2022-08-06 DIAGNOSIS — F41.1 GAD (GENERALIZED ANXIETY DISORDER): ICD-10-CM

## 2022-08-06 NOTE — LETTER
August 18, 2022      Kolby Funk  [address] [place] [id number]        Dear Mr. Kolby Funk,    We recently received a call from your pharmacy requesting a refill of your medication Fluoxetine.    We are contacting you today to notify you that you are due for a Physical and Lab work for further refills.    We have authorized one refill of your medication to allow time for you to schedule your appointment.    Please call [phone] to schedule an appointment or if you have MyChart you can schedule with your provider as well.    Taking care of your health is important to us, an ongoing visits with your provider are vital to your care. We look forward to seeing you in the near future.    Thank you for using [facility] for your Medical Needs.          Sincerely,     Abraham Cardoza MD

## 2022-08-11 NOTE — TELEPHONE ENCOUNTER
Routing refill request to provider for review/approval because:  Cindy given x1 and patient did not follow up, please advise  Heena Rosales RN

## 2022-08-11 NOTE — TELEPHONE ENCOUNTER
One month fill provided, pt will need f/u appt for ongoing refill.  Please call to schedule CPE.    Abraham Cardoza MD

## 2022-08-12 ENCOUNTER — MYC MEDICAL ADVICE (OUTPATIENT)
Dept: FAMILY MEDICINE | Facility: CLINIC | Age: 42
End: 2022-08-12

## 2022-09-11 ENCOUNTER — HEALTH MAINTENANCE LETTER (OUTPATIENT)
Age: 42
End: 2022-09-11

## 2022-09-13 DIAGNOSIS — F41.1 GAD (GENERALIZED ANXIETY DISORDER): ICD-10-CM

## 2022-09-13 NOTE — TELEPHONE ENCOUNTER
Routing refill request to provider for review/approval because:  Patient needs to be seen because it has been more than 1 year since last office visit.  Patient was last seen on 2/15/2021     Cindy HODGE RN   Patient Advocate Liaison (PAL)  [facility]

## 2022-09-13 NOTE — LETTER
September 27, 2022      Kolby Funk  [address] [place] [id number]        Juanis Meier,     We recently received a call from your pharmacy requesting a refill of your medication FLUoxetine. We are contacting you today to notify you that you are due for a PHYSICAL for further refills.     We have authorized a one time refill of your medication to allow time for you to schedule an appointment.     Please call [phone] to schedule an appointment or if you have MyChart you can schedule with your provider as well.     Taking care of your health is important to us, and ongoing visits with your provider are vital to your care. We look forward to seeing you in the near future.     Thank you for using [facility] for your medical needs.       Sincerely,        Abraham Cardoza MD

## 2022-09-13 NOTE — TELEPHONE ENCOUNTER
LVM- patient needs px for more refills.    [place] DO[place]  [facility]   [facility]

## 2022-12-13 ENCOUNTER — E-VISIT (OUTPATIENT)
Dept: FAMILY MEDICINE | Facility: CLINIC | Age: 42
End: 2022-12-13
Payer: COMMERCIAL

## 2022-12-13 ENCOUNTER — VIRTUAL VISIT (OUTPATIENT)
Dept: FAMILY MEDICINE | Facility: CLINIC | Age: 42
End: 2022-12-13
Payer: COMMERCIAL

## 2022-12-13 DIAGNOSIS — K12.2 UVULITIS: Primary | ICD-10-CM

## 2022-12-13 DIAGNOSIS — J02.9 ACUTE PHARYNGITIS, UNSPECIFIED ETIOLOGY: Primary | ICD-10-CM

## 2022-12-13 PROCEDURE — 99207 PR NON-BILLABLE SERV PER CHARTING: CPT | Performed by: PHYSICIAN ASSISTANT

## 2022-12-13 PROCEDURE — 99213 OFFICE O/P EST LOW 20 MIN: CPT | Mod: 95 | Performed by: FAMILY MEDICINE

## 2022-12-13 RX ORDER — AMOXICILLIN 875 MG
875 TABLET ORAL 2 TIMES DAILY
Qty: 20 TABLET | Refills: 0 | Status: SHIPPED | OUTPATIENT
Start: 2022-12-13 | End: 2022-12-23

## 2022-12-13 NOTE — PROGRESS NOTES
"Hardeep is a 41 year old who is being evaluated via a billable video visit.      How would you like to obtain your AVS? MyChart  If the video visit is dropped, the invitation should be resent by: Text to cell phone: [phone]  Will anyone else be joining your video visit? No          Assessment & Plan     Uvulitis    - REVIEW OF HEALTH MAINTENANCE PROTOCOL ORDERS  - amoxicillin (AMOXIL) 875 MG tablet; Take 1 tablet (875 mg) by mouth 2 times daily for 10 days    No follow-ups on file.    Enio Riddle MD  [facility]      Subjective   Hardeep is a 41 year old accompanied by his , presenting for the following health issues:  Pharyngitis    Has not tested for COVID19, said he has no other symptoms    History of Present Illness       Reason for visit:  Sore throat, can't talk  Symptom onset:  Today  Symptoms include:  See above  Symptom intensity:  Moderate  Symptom progression:  Worsening  Had these symptoms before:  Yes  Has tried/received treatment for these symptoms:  Yes  Previous treatment was successful:  Yes  Prior treatment description:  Medicine  What makes it worse:  Talking  What makes it better:  Medicine    He eats 0-1 servings of fruits and vegetables daily.He consumes 1 sweetened beverage(s) daily.He exercises with enough effort to increase his heart rate 9 or less minutes per day.  He exercises with enough effort to increase his heart rate 3 or less days per week.   He is taking medications regularly.     Has had this before, also has had strep several times  Has little kids        Review of Systems   Constitutional, HEENT, cardiovascular, pulmonary, gi and gu systems are negative, except as otherwise noted.      Objective    Vitals - Patient Reported  Weight (Patient Reported): 97.5 kg (215 lb)  Height (Patient Reported): 171.5 cm (5' 7.5\")  BMI (Based on Pt Reported Ht/Wt): 33.18  Pain Score: Severe Pain (6)  Pain Loc: Throat      Vitals:  No vitals were obtained today " due to virtual visit.    Physical Exam   GENERAL: Healthy, alert and no distress  EYES: Eyes grossly normal to inspection.  No discharge or erythema, or obvious scleral/conjunctival abnormalities.  RESP: No audible wheeze, cough, or visible cyanosis.  No visible retractions or increased work of breathing.    Able to talk and swallow, but it is painful    SKIN: Visible skin clear. No significant rash, abnormal pigmentation or lesions.  NEURO: Cranial nerves grossly intact.  Mentation and speech appropriate for age.  PSYCH: Mentation appears normal, affect normal/bright, judgement and insight intact, normal speech and appearance well-groomed.            Video-Visit Details    Joined the call at 12/13/2022, 11:09:24 am.  Left the call at 12/13/2022, 11:13:01 am.  You were on the call for 3 minutes 36 seconds .  Originating Location (pt. Location): Home        Distant Location (provider location):  On-site    Platform used for Video Visit: Erick

## 2023-03-10 ENCOUNTER — E-VISIT (OUTPATIENT)
Dept: FAMILY MEDICINE | Facility: CLINIC | Age: 43
End: 2023-03-10
Payer: COMMERCIAL

## 2023-03-10 DIAGNOSIS — F41.1 GAD (GENERALIZED ANXIETY DISORDER): ICD-10-CM

## 2023-03-10 PROCEDURE — 99421 OL DIG E/M SVC 5-10 MIN: CPT | Performed by: FAMILY MEDICINE

## 2023-03-10 ASSESSMENT — ANXIETY QUESTIONNAIRES
GAD7 TOTAL SCORE: 12
4. TROUBLE RELAXING: MORE THAN HALF THE DAYS
5. BEING SO RESTLESS THAT IT IS HARD TO SIT STILL: SEVERAL DAYS
7. FEELING AFRAID AS IF SOMETHING AWFUL MIGHT HAPPEN: NOT AT ALL
GAD7 TOTAL SCORE: 12
6. BECOMING EASILY ANNOYED OR IRRITABLE: MORE THAN HALF THE DAYS
GAD7 TOTAL SCORE: 12
8. IF YOU CHECKED OFF ANY PROBLEMS, HOW DIFFICULT HAVE THESE MADE IT FOR YOU TO DO YOUR WORK, TAKE CARE OF THINGS AT HOME, OR GET ALONG WITH OTHER PEOPLE?: EXTREMELY DIFFICULT
2. NOT BEING ABLE TO STOP OR CONTROL WORRYING: NEARLY EVERY DAY
3. WORRYING TOO MUCH ABOUT DIFFERENT THINGS: NEARLY EVERY DAY
1. FEELING NERVOUS, ANXIOUS, OR ON EDGE: SEVERAL DAYS
7. FEELING AFRAID AS IF SOMETHING AWFUL MIGHT HAPPEN: NOT AT ALL

## 2023-03-11 ASSESSMENT — ANXIETY QUESTIONNAIRES: GAD7 TOTAL SCORE: 12

## 2023-03-13 NOTE — TELEPHONE ENCOUNTER
Called patient again - he said he will schedule his Px on MyChart when he has access to his schedule.    Peggy Wilkinson

## 2023-04-02 DIAGNOSIS — F41.1 GAD (GENERALIZED ANXIETY DISORDER): ICD-10-CM

## 2023-04-03 NOTE — TELEPHONE ENCOUNTER
Prescription approved per [facility] Refill Protocol.    Adali WILLETT RN  [facility]

## 2023-04-04 ENCOUNTER — MYC REFILL (OUTPATIENT)
Dept: FAMILY MEDICINE | Facility: CLINIC | Age: 43
End: 2023-04-04
Payer: COMMERCIAL

## 2023-04-04 DIAGNOSIS — F41.1 GAD (GENERALIZED ANXIETY DISORDER): ICD-10-CM

## 2023-04-10 ENCOUNTER — MYC MEDICAL ADVICE (OUTPATIENT)
Dept: FAMILY MEDICINE | Facility: CLINIC | Age: 43
End: 2023-04-10
Payer: COMMERCIAL

## 2023-04-10 DIAGNOSIS — F41.1 GAD (GENERALIZED ANXIETY DISORDER): ICD-10-CM

## 2023-06-03 ENCOUNTER — HEALTH MAINTENANCE LETTER (OUTPATIENT)
Age: 43
End: 2023-06-03

## 2023-07-31 ENCOUNTER — MYC REFILL (OUTPATIENT)
Dept: FAMILY MEDICINE | Facility: CLINIC | Age: 43
End: 2023-07-31
Payer: COMMERCIAL

## 2023-07-31 DIAGNOSIS — F41.1 GAD (GENERALIZED ANXIETY DISORDER): ICD-10-CM

## 2023-08-02 NOTE — TELEPHONE ENCOUNTER
Medication is being filled for 1 time refill only due to:  Patient needs to be seen because it has been more than one year since last visit.  Future Appointments 8/2/2023 - 1/29/2024        Date Visit Type Length Department Provider     10/13/2023  3:30 PM [facility] PREVENTATIVE ADULT VISIT 30 min RM Abraham Infante MD    Location Instructions:     [facility] is located at [address] [address], near [address]. This is a half mile west of [address]. If traveling west on [address], turn south onto [address], then west onto [address] to reach the parking lot. If traveling east on [address], turn south directly onto [address].                   Kalina Eric RN, BSN  [facility]

## 2023-11-21 ENCOUNTER — MYC REFILL (OUTPATIENT)
Dept: FAMILY MEDICINE | Facility: CLINIC | Age: 43
End: 2023-11-21
Payer: COMMERCIAL

## 2023-11-21 DIAGNOSIS — F41.1 GAD (GENERALIZED ANXIETY DISORDER): ICD-10-CM

## 2024-03-22 ENCOUNTER — OFFICE VISIT (OUTPATIENT)
Dept: FAMILY MEDICINE | Facility: CLINIC | Age: 44
End: 2024-03-22
Payer: COMMERCIAL

## 2024-03-22 VITALS
DIASTOLIC BLOOD PRESSURE: 104 MMHG | HEART RATE: 80 BPM | SYSTOLIC BLOOD PRESSURE: 170 MMHG | RESPIRATION RATE: 16 BRPM | OXYGEN SATURATION: 98 % | HEIGHT: 67 IN | WEIGHT: 217.7 LBS | TEMPERATURE: 98 F | BODY MASS INDEX: 34.17 KG/M2

## 2024-03-22 DIAGNOSIS — Z11.4 SCREENING FOR HIV (HUMAN IMMUNODEFICIENCY VIRUS): ICD-10-CM

## 2024-03-22 DIAGNOSIS — E78.5 DYSLIPIDEMIA: ICD-10-CM

## 2024-03-22 DIAGNOSIS — Z11.59 NEED FOR HEPATITIS C SCREENING TEST: ICD-10-CM

## 2024-03-22 DIAGNOSIS — Z00.00 ROUTINE GENERAL MEDICAL EXAMINATION AT A HEALTH CARE FACILITY: Primary | ICD-10-CM

## 2024-03-22 DIAGNOSIS — F41.1 GAD (GENERALIZED ANXIETY DISORDER): ICD-10-CM

## 2024-03-22 DIAGNOSIS — R06.83 SNORING: ICD-10-CM

## 2024-03-22 DIAGNOSIS — R03.0 ELEVATED BLOOD PRESSURE READING WITHOUT DIAGNOSIS OF HYPERTENSION: ICD-10-CM

## 2024-03-22 LAB
ALBUMIN SERPL BCG-MCNC: 4.6 G/DL (ref 3.5–5.2)
ALP SERPL-CCNC: 95 U/L (ref 40–150)
ALT SERPL W P-5'-P-CCNC: 32 U/L (ref 0–70)
ANION GAP SERPL CALCULATED.3IONS-SCNC: 11 MMOL/L (ref 7–15)
AST SERPL W P-5'-P-CCNC: 28 U/L (ref 0–45)
BASOPHILS # BLD AUTO: 0 10E3/UL (ref 0–0.2)
BASOPHILS NFR BLD AUTO: 0 %
BILIRUB SERPL-MCNC: 0.6 MG/DL
BUN SERPL-MCNC: 11.9 MG/DL (ref 6–20)
CALCIUM SERPL-MCNC: 9.7 MG/DL (ref 8.6–10)
CHLORIDE SERPL-SCNC: 102 MMOL/L (ref 98–107)
CHOLEST SERPL-MCNC: 333 MG/DL
CREAT SERPL-MCNC: 0.8 MG/DL (ref 0.67–1.17)
DEPRECATED HCO3 PLAS-SCNC: 26 MMOL/L (ref 22–29)
EGFRCR SERPLBLD CKD-EPI 2021: >90 ML/MIN/1.73M2
EOSINOPHIL # BLD AUTO: 0.2 10E3/UL (ref 0–0.7)
EOSINOPHIL NFR BLD AUTO: 2 %
ERYTHROCYTE [DISTWIDTH] IN BLOOD BY AUTOMATED COUNT: 12.5 % (ref 10–15)
FASTING STATUS PATIENT QL REPORTED: YES
GLUCOSE SERPL-MCNC: 108 MG/DL (ref 70–99)
HCT VFR BLD AUTO: 46.2 % (ref 40–53)
HDLC SERPL-MCNC: 38 MG/DL
HGB BLD-MCNC: 16.6 G/DL (ref 13.3–17.7)
IMM GRANULOCYTES # BLD: 0 10E3/UL
IMM GRANULOCYTES NFR BLD: 0 %
LDLC SERPL CALC-MCNC: 258 MG/DL
LYMPHOCYTES # BLD AUTO: 2.3 10E3/UL (ref 0.8–5.3)
LYMPHOCYTES NFR BLD AUTO: 30 %
MCH RBC QN AUTO: 31.9 PG (ref 26.5–33)
MCHC RBC AUTO-ENTMCNC: 35.9 G/DL (ref 31.5–36.5)
MCV RBC AUTO: 89 FL (ref 78–100)
MONOCYTES # BLD AUTO: 0.6 10E3/UL (ref 0–1.3)
MONOCYTES NFR BLD AUTO: 8 %
NEUTROPHILS # BLD AUTO: 4.5 10E3/UL (ref 1.6–8.3)
NEUTROPHILS NFR BLD AUTO: 59 %
NONHDLC SERPL-MCNC: 295 MG/DL
PLATELET # BLD AUTO: 275 10E3/UL (ref 150–450)
POTASSIUM SERPL-SCNC: 4.6 MMOL/L (ref 3.4–5.3)
PROT SERPL-MCNC: 7.5 G/DL (ref 6.4–8.3)
RBC # BLD AUTO: 5.21 10E6/UL (ref 4.4–5.9)
SODIUM SERPL-SCNC: 139 MMOL/L (ref 135–145)
TRIGL SERPL-MCNC: 184 MG/DL
TSH SERPL DL<=0.005 MIU/L-ACNC: 1.38 UIU/ML (ref 0.3–4.2)
WBC # BLD AUTO: 7.6 10E3/UL (ref 4–11)

## 2024-03-22 PROCEDURE — 84443 ASSAY THYROID STIM HORMONE: CPT | Performed by: GENERAL PRACTICE

## 2024-03-22 PROCEDURE — 85025 COMPLETE CBC W/AUTO DIFF WBC: CPT | Performed by: GENERAL PRACTICE

## 2024-03-22 PROCEDURE — 99396 PREV VISIT EST AGE 40-64: CPT | Performed by: GENERAL PRACTICE

## 2024-03-22 PROCEDURE — 80053 COMPREHEN METABOLIC PANEL: CPT | Performed by: GENERAL PRACTICE

## 2024-03-22 PROCEDURE — 36415 COLL VENOUS BLD VENIPUNCTURE: CPT | Performed by: GENERAL PRACTICE

## 2024-03-22 PROCEDURE — 80061 LIPID PANEL: CPT | Performed by: GENERAL PRACTICE

## 2024-03-22 SDOH — HEALTH STABILITY: PHYSICAL HEALTH: ON AVERAGE, HOW MANY MINUTES DO YOU ENGAGE IN EXERCISE AT THIS LEVEL?: 10 MIN

## 2024-03-22 SDOH — HEALTH STABILITY: PHYSICAL HEALTH: ON AVERAGE, HOW MANY DAYS PER WEEK DO YOU ENGAGE IN MODERATE TO STRENUOUS EXERCISE (LIKE A BRISK WALK)?: 2 DAYS

## 2024-03-22 ASSESSMENT — PAIN SCALES - GENERAL: PAINLEVEL: NO PAIN (0)

## 2024-03-22 ASSESSMENT — SOCIAL DETERMINANTS OF HEALTH (SDOH): HOW OFTEN DO YOU GET TOGETHER WITH FRIENDS OR RELATIVES?: ONCE A WEEK

## 2024-03-22 NOTE — PROGRESS NOTES
"Preventive Care Visit  [facility] DIMITRI Cummins MD, Internal Medicine  Mar 22, 2024      Assessment & Plan     Routine general medical examination at a health care facility  Elevated BP   Discussed low salt  Check home BP and follow-up   - Comprehensive metabolic panel (BMP + Alb, Alk Phos, ALT, AST, Total. Bili, TP); Future  - CBC with platelets and differential; Future  - Lipid panel reflex to direct LDL Fasting; Future  - Comprehensive metabolic panel (BMP + Alb, Alk Phos, ALT, AST, Total. Bili, TP)  - CBC with platelets and differential  - Lipid panel reflex to direct LDL Fasting    Screening for HIV (human immunodeficiency virus)    - HIV Antigen Antibody Combo; Future    Need for hepatitis C screening test    - Hepatitis C Screen Reflex to HCV RNA Quant and Genotype; Future    NICHOLAS (generalized anxiety disorder)    - FLUoxetine (PROZAC) 20 MG capsule; Take 3 capsules (60 mg) by mouth daily    Snoring    - Adult Sleep Eval & Management  Referral; Future    Elevated blood pressure reading without diagnosis of hypertension  Follow-up home BP in 1 week  - TSH with free T4 reflex; Future  - TSH with free T4 reflex      BMI  Estimated body mass index is 34.1 kg/m  as calculated from the following:    Height as of this encounter: 1.702 m (5' 7\").    Weight as of this encounter: 98.7 kg (217 lb 11.2 oz).   Weight management plan: Discussed healthy diet and exercise guidelines    Counseling  Appropriate preventive services were discussed with this patient, including applicable screening as appropriate for fall prevention, nutrition, physical activity, Tobacco-use cessation, weight loss and cognition.  Checklist reviewing preventive services available has been given to the patient.  Reviewed patient's diet, addressing concerns and/or questions.   He is at risk for lack of exercise and has been provided with information to increase physical activity for the benefit of his well-being.   The " patient was instructed to see the dentist every 6 months.           Hubert Meier is a 43 year old, presenting for the following:  Physical        3/22/2024     8:40 AM   Additional Questions   Roomed by Marci MON MA   Accompanied by self         3/22/2024     8:40 AM   Patient Reported Additional Medications   Patient reports taking the following new medications none        Health Care Directive  Patient does not have a Health Care Directive or Living Will: Discussed advance care planning with patient; however, patient declined at this time.          3/22/2024   General Health   How would you rate your overall physical health? (!) FAIR   Feel stress (tense, anxious, or unable to sleep) Rather much   (!) STRESS CONCERN      3/22/2024   Nutrition   Three or more servings of calcium each day? (!) I DON'T KNOW   Diet: Regular (no restrictions)   How many servings of fruit and vegetables per day? (!) 2-3   How many sweetened beverages each day? (!) 2         3/22/2024   Exercise   Days per week of moderate/strenous exercise 2 days   Average minutes spent exercising at this level 10 min   (!) EXERCISE CONCERN      3/22/2024   Social Factors   Frequency of gathering with friends or relatives Once a week   Worry food won't last until get money to buy more No   Food not last or not have enough money for food? No   Do you have housing?  Yes   Are you worried about losing your housing? No   Lack of transportation? No   Unable to get utilities (heat,electricity)? No         3/22/2024   Dental   Dentist two times every year? (!) NO         3/22/2024   TB Screening   Were you born outside of the [place]? No         Today's PHQ-2 Score:       3/22/2024     8:30 AM   PHQ-2 ( 1999 [employer])   Q1: Little interest or pleasure in doing things 0   Q2: Feeling down, depressed or hopeless 0   PHQ-2 Score 0   Q1: Little interest or pleasure in doing things Not at all   Q2: Feeling down, depressed or hopeless Not at all   PHQ-2 Score 0            "3/22/2024   Substance Use   Alcohol more than 3/day or more than 7/wk No   Do you use any other substances recreationally? No     Social History     Tobacco Use    Smoking status: Former     Types: Cigarettes    Smokeless tobacco: Never   Vaping Use    Vaping Use: Never used   Substance Use Topics    Alcohol use: Yes     Alcohol/week: 0.0 standard drinks of alcohol     Comment: weekends    Drug use: No             3/22/2024   One time HIV Screening   Previous HIV test? No         3/22/2024   STI Screening   New sexual partner(s) since last STI/HIV test? No   ASCVD Risk   The ASCVD Risk score (Km SCHWARZ, et al., 2019) failed to calculate for the following reasons:    Cannot find a previous HDL lab    Cannot find a previous total cholesterol lab        3/22/2024   Contraception/Family Planning   Questions about contraception or family planning No        Reviewed and updated as needed this visit by Provider                          Review of Systems  Constitutional, HEENT, cardiovascular, pulmonary, GI, , musculoskeletal, neuro, skin, endocrine and psych systems are negative, except as otherwise noted.     Objective    Exam  BP (!) 170/104 (BP Location: Right arm, Patient Position: Sitting, Cuff Size: Adult Large)   Pulse 80   Temp 98  F (36.7  C) (Oral)   Resp 16   Ht 1.702 m (5' 7\")   Wt 98.7 kg (217 lb 11.2 oz)   SpO2 98%   BMI 34.10 kg/m     Estimated body mass index is 34.1 kg/m  as calculated from the following:    Height as of this encounter: 1.702 m (5' 7\").    Weight as of this encounter: 98.7 kg (217 lb 11.2 oz).    Physical Exam  Constitutional:       Appearance: Normal appearance.   HENT:      Head: Normocephalic and atraumatic.      Right Ear: Tympanic membrane, ear canal and external ear normal.      Left Ear: Tympanic membrane, ear canal and external ear normal.      Nose: Nose normal.      Mouth/Throat:      Mouth: Mucous membranes are moist.      Pharynx: Oropharynx is clear.   Eyes: "      Extraocular Movements: Extraocular movements intact.      Conjunctiva/sclera: Conjunctivae normal.      Pupils: Pupils are equal, round, and reactive to light.   Cardiovascular:      Rate and Rhythm: Normal rate and regular rhythm.      Pulses: Normal pulses.      Heart sounds: Normal heart sounds.   Pulmonary:      Effort: Pulmonary effort is normal.      Breath sounds: Normal breath sounds.   Abdominal:      General: Abdomen is flat. Bowel sounds are normal.      Palpations: Abdomen is soft.   Musculoskeletal:         General: Normal range of motion.      Cervical back: Normal range of motion and neck supple.   Skin:     General: Skin is warm.      Capillary Refill: Capillary refill takes less than 2 seconds.   Neurological:      General: No focal deficit present.      Mental Status: He is alert and oriented to person, place, and time. Mental status is at baseline.   Psychiatric:         Mood and Affect: Mood and affect normal.         Speech: Speech normal.         Behavior: Behavior normal. Behavior is cooperative.         Thought Content: Thought content normal.         Cognition and Memory: Cognition normal.         Judgment: Judgment normal.               Signed Electronically by: Liya Cummins MD

## 2024-03-22 NOTE — PATIENT INSTRUCTIONS
Preventive Care Advice   This is general advice given by our system to help you stay healthy. However, your care team may have specific advice just for you. Please talk to your care team about your preventive care needs.  Nutrition  Eat 5 or more servings of fruits and vegetables each day.  Try wheat bread, brown rice and whole grain pasta (instead of white bread, rice, and pasta).  Get enough calcium and vitamin D. Check the label on foods and aim for 100% of the RDA (recommended daily allowance).  Lifestyle  Exercise at least 150 minutes each week   (30 minutes a day, 5 days a week).  Do muscle strengthening activities 2 days a week. These help control your weight and prevent disease.  No smoking.  Wear sunscreen to prevent skin cancer.  Have a dental exam and cleaning every 6 months.  Yearly exams  See your health care team every year to talk about:  Any changes in your health.  Any medicines your care team has prescribed.  Preventive care, family planning, and ways to prevent chronic diseases.  Shots (vaccines)   HPV shots (up to age 26), if you've never had them before.  Hepatitis B shots (up to age 59), if you've never had them before.  COVID-19 shot: Get this shot when it's due.  Flu shot: Get a flu shot every year.  Tetanus shot: Get a tetanus shot every 10 years.  Pneumococcal, hepatitis A, and RSV shots: Ask your care team if you need these based on your risk.  Shingles shot (for age 50 and up).  General health tests  Diabetes screening:  Starting at age 35, Get screened for diabetes at least every 3 years.  If you are younger than age 35, ask your care team if you should be screened for diabetes.  Cholesterol test: At age 39, start having a cholesterol test every 5 years, or more often if advised.  Bone density scan (DEXA): At age 50, ask your care team if you should have this scan for osteoporosis (brittle bones).  Hepatitis C: Get tested at least once in your life.  STIs (sexually transmitted  infections)  Before age 24: Ask your care team if you should be screened for STIs.  After age 24: Get screened for STIs if you're at risk. You are at risk for STIs (including HIV) if:  You are sexually active with more than one person.  You don't use condoms every time.  You or a partner was diagnosed with a sexually transmitted infection.  If you are at risk for HIV, ask about PrEP medicine to prevent HIV.  Get tested for HIV at least once in your life, whether you are at risk for HIV or not.  Cancer screening tests  Cervical cancer screening: If you have a cervix, begin getting regular cervical cancer screening tests at age 21. Most people who have regular screenings with normal results can stop after age 65. Talk about this with your provider.  Breast cancer scan (mammogram): If you've ever had breasts, begin having regular mammograms starting at age 40. This is a scan to check for breast cancer.  Colon cancer screening: It is important to start screening for colon cancer at age 45.  Have a colonoscopy test every 10 years (or more often if you're at risk) Or, ask your provider about stool tests like a FIT test every year or Cologuard test every 3 years.  To learn more about your testing options, visit: https://www.Tinychat/940692.pdf.  For help making a decision, visit: https://bit.ly/fh43626.  Prostate cancer screening test: If you have a prostate and are age 55 to 69, ask your provider if you would benefit from a yearly prostate cancer screening test.  Lung cancer screening: If you are a current or former smoker age 50 to 80, ask your care team if ongoing lung cancer screenings are right for you.  For informational purposes only. Not to replace the advice of your health care provider. Copyright   2023 [employer]. All rights reserved. Clinically reviewed by the [facility] Transitions Program. [employer] 478748 - REV 01/24.    Learning About Stress  What is stress?     Stress is your  body's response to a hard situation. Your body can have a physical, emotional, or mental response. Stress is a fact of life for most people, and it affects everyone differently. What causes stress for you may not be stressful for someone else.  A lot of things can cause stress. You may feel stress when you go on a job interview, take a test, or run a race. This kind of short-term stress is normal and even useful. It can help you if you need to work hard or react quickly. For example, stress can help you finish an important job on time.  Long-term stress is caused by ongoing stressful situations or events. Examples of long-term stress include long-term health problems, ongoing problems at work, or conflicts in your family. Long-term stress can harm your health.  How does stress affect your health?  When you are stressed, your body responds as though you are in danger. It makes hormones that speed up your heart, make you breathe faster, and give you a burst of energy. This is called the fight-or-flight stress response. If the stress is over quickly, your body goes back to normal and no harm is done.  But if stress happens too often or lasts too long, it can have bad effects. Long-term stress can make you more likely to get sick, and it can make symptoms of some diseases worse. If you tense up when you are stressed, you may develop neck, shoulder, or low back pain. Stress is linked to high blood pressure and heart disease.  Stress also harms your emotional health. It can make you zheng, tense, or depressed. Your relationships may suffer, and you may not do well at work or school.  What can you do to manage stress?  You can try these things to help manage stress:   Do something active. Exercise or activity can help reduce stress. Walking is a great way to get started. Even everyday activities such as housecleaning or yard work can help.  Try yoga or melissa chi. These techniques combine exercise and meditation. You may need  some training at first to learn them.  Do something you enjoy. For example, listen to music or go to a movie. Practice your hobby or do volunteer work.  Meditate. This can help you relax, because you are not worrying about what happened before or what may happen in the future.  Do guided imagery. Imagine yourself in any setting that helps you feel calm. You can use online videos, books, or a teacher to guide you.  Do breathing exercises. For example:  From a standing position, bend forward from the waist with your knees slightly bent. Let your arms dangle close to the floor.  Breathe in slowly and deeply as you return to a standing position. Roll up slowly and lift your head last.  Hold your breath for just a few seconds in the standing position.  Breathe out slowly and bend forward from the waist.  Let your feelings out. Talk, laugh, cry, and express anger when you need to. Talking with supportive friends or family, a counselor, or a john leader about your feelings is a healthy way to relieve stress. Avoid discussing your feelings with people who make you feel worse.  Write. It may help to write about things that are bothering you. This helps you find out how much stress you feel and what is causing it. When you know this, you can find better ways to cope.  What can you do to prevent stress?  You might try some of these things to help prevent stress:  Manage your time. This helps you find time to do the things you want and need to do.  Get enough sleep. Your body recovers from the stresses of the day while you are sleeping.  Get support. Your family, friends, and community can make a difference in how you experience stress.  Limit your news feed. Avoid or limit time on social media or news that may make you feel stressed.  Do something active. Exercise or activity can help reduce stress. Walking is a great way to get started.  Where can you learn more?  Go to https://www.healthwise.net/patiented  Enter N032 in the  "search box to learn more about \"Learning About Stress.\"  Current as of: October 24, 2023               Content Version: 14.0    2273-4673 [employer].   Care instructions adapted under license by your healthcare professional. If you have questions about a medical condition or this instruction, always ask your healthcare professional. [employer] disclaims any warranty or liability for your use of this information.        Check home blood pressure 2x per day for 7 days.  Normal /80.   "

## 2024-04-02 ENCOUNTER — LAB (OUTPATIENT)
Dept: LAB | Facility: CLINIC | Age: 44
End: 2024-04-02
Payer: COMMERCIAL

## 2024-04-02 DIAGNOSIS — E78.5 DYSLIPIDEMIA: ICD-10-CM

## 2024-04-02 DIAGNOSIS — Z11.4 SCREENING FOR HIV (HUMAN IMMUNODEFICIENCY VIRUS): ICD-10-CM

## 2024-04-02 DIAGNOSIS — Z11.59 NEED FOR HEPATITIS C SCREENING TEST: ICD-10-CM

## 2024-04-02 PROCEDURE — 87389 HIV-1 AG W/HIV-1&-2 AB AG IA: CPT

## 2024-04-02 PROCEDURE — 80061 LIPID PANEL: CPT

## 2024-04-02 PROCEDURE — 86803 HEPATITIS C AB TEST: CPT

## 2024-04-02 PROCEDURE — 36415 COLL VENOUS BLD VENIPUNCTURE: CPT

## 2024-04-03 LAB
CHOLEST SERPL-MCNC: 373 MG/DL
FASTING STATUS PATIENT QL REPORTED: YES
HCV AB SERPL QL IA: NONREACTIVE
HDLC SERPL-MCNC: 48 MG/DL
HIV 1+2 AB+HIV1 P24 AG SERPL QL IA: NONREACTIVE
LDLC SERPL CALC-MCNC: 297 MG/DL
NONHDLC SERPL-MCNC: 325 MG/DL
TRIGL SERPL-MCNC: 140 MG/DL

## 2024-04-12 ENCOUNTER — VIRTUAL VISIT (OUTPATIENT)
Dept: FAMILY MEDICINE | Facility: CLINIC | Age: 44
End: 2024-04-12
Payer: COMMERCIAL

## 2024-04-12 DIAGNOSIS — I10 BENIGN ESSENTIAL HYPERTENSION: Primary | ICD-10-CM

## 2024-04-12 DIAGNOSIS — E78.5 DYSLIPIDEMIA: ICD-10-CM

## 2024-04-12 PROCEDURE — 99441 PR PHYSICIAN TELEPHONE EVALUATION 5-10 MIN: CPT | Performed by: GENERAL PRACTICE

## 2024-04-12 RX ORDER — ATORVASTATIN CALCIUM 40 MG/1
40 TABLET, FILM COATED ORAL DAILY
Qty: 90 TABLET | Refills: 4 | Status: SHIPPED | OUTPATIENT
Start: 2024-04-12

## 2024-04-12 RX ORDER — AMLODIPINE BESYLATE 5 MG/1
5 TABLET ORAL DAILY
Qty: 30 TABLET | Refills: 3 | Status: SHIPPED | OUTPATIENT
Start: 2024-04-12 | End: 2024-08-13

## 2024-04-12 NOTE — PROGRESS NOTES
Hardeep is a 43 year old who is being evaluated via a billable telephone visit.    How would you like to obtain your AVS? MyChart  If the video visit is dropped, the invitation should be resent by: Text to cell phone: [phone]  Will anyone else be joining your video visit? No  Originating Location (pt. Location): Home    Distant Location (provider location):  On-site    Assessment & Plan     Benign essential hypertension  Home BP >140s  Discussed low salt diet, heart healthy  Discussed starting amlodipine and monitor BP.  Monitor for low blood pressure symptoms.   Follow-up in 4--6 weeks  - amLODIPine (NORVASC) 5 MG tablet; Take 1 tablet (5 mg) by mouth daily    Dyslipidemia  Discussed ?familiar hyperlipidemia  Recommend to check lipids in his children, 10 , 11, 15  Mediterranean diet  Cardio exercise  - atorvastatin (LIPITOR) 40 MG tablet; Take 1 tablet (40 mg) by mouth daily  - Lipid panel reflex to direct LDL Fasting; Future  - Adult Cardiology Eval  Referral; Future                  Subjective   Hardeep is a 43 year old, presenting for the following health issues:  Video Visit and Lab Result Notice (Discuss recent lab results)      4/12/2024     4:17 PM   Additional Questions   Roomed by Katie         4/12/2024     4:17 PM   Patient Reported Additional Medications   Patient reports taking the following new medications none     History of Present Illness       Hyperlipidemia:  He presents for follow up of hyperlipidemia.   He is not taking medication to lower cholesterol. He is not having myalgia or other side effects to statin medications.    Hypertension: He presents for follow up of hypertension.  He does check blood pressure  regularly outside of the clinic. Outside blood pressures have been over 140/90. He does not follow a low salt diet. He consumes 1 sweetened beverage(s) daily.He exercises with enough effort to increase his heart rate 10 to 19 minutes per day.  He exercises with enough effort to  increase his heart rate 3 or less days per week.   He is taking medications regularly.               Review of Systems  Constitutional, HEENT, cardiovascular, pulmonary, gi and gu systems are negative, except as otherwise noted.      Objective           Vitals:  No vitals were obtained today due to virtual visit.    Physical Exam   General: Alert and no distress //Respiratory: No audible wheeze, cough, or shortness of breath // Psychiatric:  Appropriate affect, tone, and pace of words            Phone call duration: 11 minutes  Signed Electronically by: Liya Cummins MD

## 2024-05-08 ENCOUNTER — MYC REFILL (OUTPATIENT)
Dept: FAMILY MEDICINE | Facility: CLINIC | Age: 44
End: 2024-05-08
Payer: COMMERCIAL

## 2024-05-08 DIAGNOSIS — I10 BENIGN ESSENTIAL HYPERTENSION: ICD-10-CM

## 2024-05-08 RX ORDER — AMLODIPINE BESYLATE 5 MG/1
5 TABLET ORAL DAILY
Qty: 30 TABLET | Refills: 3 | OUTPATIENT
Start: 2024-05-08

## 2024-06-24 ENCOUNTER — MYC REFILL (OUTPATIENT)
Dept: FAMILY MEDICINE | Facility: CLINIC | Age: 44
End: 2024-06-24
Payer: COMMERCIAL

## 2024-06-24 DIAGNOSIS — F41.1 GAD (GENERALIZED ANXIETY DISORDER): ICD-10-CM

## 2024-09-09 DIAGNOSIS — I10 BENIGN ESSENTIAL HYPERTENSION: ICD-10-CM

## 2024-09-09 RX ORDER — AMLODIPINE BESYLATE 5 MG/1
5 TABLET ORAL DAILY
Qty: 30 TABLET | Refills: 0 | Status: SHIPPED | OUTPATIENT
Start: 2024-09-09

## 2024-09-09 NOTE — TELEPHONE ENCOUNTER
One month fill provided, pt will need f/u appt for ongoing refill.  Please call to schedule BP f/u.    Abraham Cardoza MD

## 2024-09-09 NOTE — TELEPHONE ENCOUNTER
KAMILAM requesting a call back for an appt. Two more attempts will be made.    Annamaria Mccormick  Lead   [facility]

## 2024-09-26 ENCOUNTER — MYC REFILL (OUTPATIENT)
Dept: FAMILY MEDICINE | Facility: CLINIC | Age: 44
End: 2024-09-26
Payer: COMMERCIAL

## 2024-09-26 DIAGNOSIS — F41.1 GAD (GENERALIZED ANXIETY DISORDER): ICD-10-CM

## 2024-10-12 DIAGNOSIS — I10 BENIGN ESSENTIAL HYPERTENSION: ICD-10-CM

## 2024-10-14 RX ORDER — AMLODIPINE BESYLATE 5 MG/1
5 TABLET ORAL DAILY
Qty: 30 TABLET | Refills: 0 | Status: SHIPPED | OUTPATIENT
Start: 2024-10-14 | End: 2024-11-07

## 2024-10-14 NOTE — TELEPHONE ENCOUNTER
KAMILAM to call back for an appointment. Two more attempts will be made.     Annamaria Mccormick  Lead   [facility] [facility]

## 2024-10-15 NOTE — TELEPHONE ENCOUNTER
LVM to call back to schedule an appointment. One more attempt will be made.     Annamaria Mccormick  Lead   [facility] [facility]

## 2024-10-16 NOTE — TELEPHONE ENCOUNTER
Was not able to connect with the Pt, but was able to leave a message with the Pt's wife letting her know to have the Pt call the clinic back.     Two more attempts will be made.     Dixie [facility]   [facility]

## 2024-11-06 DIAGNOSIS — I10 BENIGN ESSENTIAL HYPERTENSION: ICD-10-CM

## 2024-11-07 RX ORDER — AMLODIPINE BESYLATE 5 MG/1
5 TABLET ORAL DAILY
Qty: 90 TABLET | Refills: 1 | Status: SHIPPED | OUTPATIENT
Start: 2024-11-07

## 2025-01-14 ENCOUNTER — MYC REFILL (OUTPATIENT)
Dept: FAMILY MEDICINE | Facility: CLINIC | Age: 45
End: 2025-01-14
Payer: COMMERCIAL

## 2025-01-14 DIAGNOSIS — F41.1 GAD (GENERALIZED ANXIETY DISORDER): ICD-10-CM

## 2025-04-26 ENCOUNTER — HEALTH MAINTENANCE LETTER (OUTPATIENT)
Age: 45
End: 2025-04-26